# Patient Record
Sex: FEMALE | Race: WHITE | Employment: OTHER | URBAN - METROPOLITAN AREA
[De-identification: names, ages, dates, MRNs, and addresses within clinical notes are randomized per-mention and may not be internally consistent; named-entity substitution may affect disease eponyms.]

---

## 2023-05-17 ENCOUNTER — APPOINTMENT (OUTPATIENT)
Dept: GENERAL RADIOLOGY | Age: 88
DRG: 871 | End: 2023-05-17
Payer: MEDICARE

## 2023-05-17 ENCOUNTER — HOSPITAL ENCOUNTER (INPATIENT)
Age: 88
LOS: 5 days | Discharge: NURSING, REHAB OR LONG-TERM CARE | DRG: 871 | End: 2023-05-22
Attending: EMERGENCY MEDICINE | Admitting: STUDENT IN AN ORGANIZED HEALTH CARE EDUCATION/TRAINING PROGRAM
Payer: MEDICARE

## 2023-05-17 ENCOUNTER — APPOINTMENT (OUTPATIENT)
Dept: CT IMAGING | Age: 88
DRG: 871 | End: 2023-05-17
Payer: MEDICARE
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
## 2023-05-17 DIAGNOSIS — J18.9 PNEUMONIA OF RIGHT LOWER LOBE DUE TO INFECTIOUS ORGANISM: Primary | ICD-10-CM

## 2023-05-17 DIAGNOSIS — R41.82 ALTERED MENTAL STATUS, UNSPECIFIED ALTERED MENTAL STATUS TYPE: ICD-10-CM

## 2023-05-17 PROBLEM — A41.9 SEPSIS (HCC): Status: ACTIVE | Noted: 2023-05-17

## 2023-05-17 LAB
ALBUMIN SERPL-MCNC: 4.1 G/DL (ref 3.4–5)
ALP SERPL-CCNC: 117 U/L (ref 40–129)
ALT SERPL-CCNC: 143 U/L (ref 10–40)
AMORPH SED URNS QL MICRO: NORMAL /HPF
ANION GAP SERPL CALCULATED.3IONS-SCNC: 12 MMOL/L (ref 3–16)
AST SERPL-CCNC: 114 U/L (ref 15–37)
BASE EXCESS BLDV CALC-SCNC: 1.5 MMOL/L (ref -2–3)
BASOPHILS # BLD: 0 K/UL (ref 0–0.2)
BASOPHILS NFR BLD: 0.1 %
BILIRUB DIRECT SERPL-MCNC: <0.2 MG/DL (ref 0–0.3)
BILIRUB INDIRECT SERPL-MCNC: ABNORMAL MG/DL (ref 0–1)
BILIRUB SERPL-MCNC: 0.6 MG/DL (ref 0–1)
BILIRUB UR QL STRIP.AUTO: NEGATIVE
BUN SERPL-MCNC: 27 MG/DL (ref 7–20)
CALCIUM SERPL-MCNC: 10.1 MG/DL (ref 8.3–10.6)
CHLORIDE SERPL-SCNC: 97 MMOL/L (ref 99–110)
CLARITY UR: CLEAR
CO2 BLDV-SCNC: 29 MMOL/L
CO2 SERPL-SCNC: 25 MMOL/L (ref 21–32)
COHGB MFR BLDV: 1.6 % (ref 0–1.5)
COLOR UR: YELLOW
CREAT SERPL-MCNC: 1.3 MG/DL (ref 0.6–1.2)
DEPRECATED RDW RBC AUTO: 13.7 % (ref 12.4–15.4)
DO-HGB MFR BLDV: 63.9 %
EKG ATRIAL RATE: 96 BPM
EKG DIAGNOSIS: NORMAL
EKG P AXIS: 81 DEGREES
EKG P-R INTERVAL: 154 MS
EKG Q-T INTERVAL: 374 MS
EKG QRS DURATION: 68 MS
EKG QTC CALCULATION (BAZETT): 472 MS
EKG R AXIS: 30 DEGREES
EKG T AXIS: 65 DEGREES
EKG VENTRICULAR RATE: 96 BPM
EOSINOPHIL # BLD: 0 K/UL (ref 0–0.6)
EOSINOPHIL NFR BLD: 0 %
EPI CELLS #/AREA URNS HPF: NORMAL /HPF (ref 0–5)
FLUAV RNA UPPER RESP QL NAA+PROBE: NEGATIVE
FLUBV AG NPH QL: NEGATIVE
GFR SERPLBLD CREATININE-BSD FMLA CKD-EPI: 39 ML/MIN/{1.73_M2}
GLUCOSE BLD-MCNC: 109 MG/DL (ref 70–99)
GLUCOSE BLD-MCNC: 116 MG/DL (ref 70–99)
GLUCOSE BLD-MCNC: 120 MG/DL (ref 70–99)
GLUCOSE BLD-MCNC: 204 MG/DL (ref 70–99)
GLUCOSE SERPL-MCNC: 234 MG/DL (ref 70–99)
GLUCOSE UR STRIP.AUTO-MCNC: NEGATIVE MG/DL
HCO3 BLDV-SCNC: 27.8 MMOL/L (ref 24–28)
HCT VFR BLD AUTO: 41.7 % (ref 36–48)
HGB BLD-MCNC: 14 G/DL (ref 12–16)
HGB UR QL STRIP.AUTO: NEGATIVE
KETONES UR STRIP.AUTO-MCNC: ABNORMAL MG/DL
LACTATE BLDV-SCNC: 3.6 MMOL/L (ref 0.4–2)
LACTATE BLDV-SCNC: <0.2 MMOL/L (ref 0.4–2)
LEUKOCYTE ESTERASE UR QL STRIP.AUTO: ABNORMAL
LIPASE SERPL-CCNC: 21 U/L (ref 13–60)
LYMPHOCYTES # BLD: 0.7 K/UL (ref 1–5.1)
LYMPHOCYTES NFR BLD: 3.2 %
MAGNESIUM SERPL-MCNC: 1.9 MG/DL (ref 1.8–2.4)
MCH RBC QN AUTO: 30.8 PG (ref 26–34)
MCHC RBC AUTO-ENTMCNC: 33.5 G/DL (ref 31–36)
MCV RBC AUTO: 91.9 FL (ref 80–100)
METHGB MFR BLDV: 0.4 % (ref 0–1.5)
MONOCYTES # BLD: 0.9 K/UL (ref 0–1.3)
MONOCYTES NFR BLD: 4.5 %
NEUTROPHILS # BLD: 18.9 K/UL (ref 1.7–7.7)
NEUTROPHILS NFR BLD: 92.2 %
NITRITE UR QL STRIP.AUTO: NEGATIVE
NT-PROBNP SERPL-MCNC: 514 PG/ML (ref 0–449)
PCO2 BLDV: 49.3 MMHG (ref 41–51)
PERFORMED ON: ABNORMAL
PH BLDV: 7.36 [PH] (ref 7.35–7.45)
PH UR STRIP.AUTO: 6 [PH] (ref 5–8)
PHOSPHATE SERPL-MCNC: 3.8 MG/DL (ref 2.5–4.9)
PLATELET # BLD AUTO: 227 K/UL (ref 135–450)
PMV BLD AUTO: 7.7 FL (ref 5–10.5)
PO2 BLDV: <30 MMHG (ref 25–40)
POTASSIUM SERPL-SCNC: 4.7 MMOL/L (ref 3.5–5.1)
PROCALCITONIN SERPL IA-MCNC: 49.84 NG/ML (ref 0–0.15)
PROT SERPL-MCNC: 7.1 G/DL (ref 6.4–8.2)
PROT UR STRIP.AUTO-MCNC: ABNORMAL MG/DL
RBC # BLD AUTO: 4.54 M/UL (ref 4–5.2)
RBC #/AREA URNS HPF: NORMAL /HPF (ref 0–4)
SAO2 % BLDV: 35 %
SARS-COV-2 RDRP RESP QL NAA+PROBE: NOT DETECTED
SODIUM SERPL-SCNC: 134 MMOL/L (ref 136–145)
SP GR UR STRIP.AUTO: 1.02 (ref 1–1.03)
TROPONIN, HIGH SENSITIVITY: 42 NG/L (ref 0–14)
TROPONIN, HIGH SENSITIVITY: 44 NG/L (ref 0–14)
UA DIPSTICK W REFLEX MICRO PNL UR: YES
URN SPEC COLLECT METH UR: ABNORMAL
UROBILINOGEN UR STRIP-ACNC: 0.2 E.U./DL
WBC # BLD AUTO: 20.5 K/UL (ref 4–11)
WBC #/AREA URNS HPF: NORMAL /HPF (ref 0–5)

## 2023-05-17 PROCEDURE — 84100 ASSAY OF PHOSPHORUS: CPT

## 2023-05-17 PROCEDURE — 83690 ASSAY OF LIPASE: CPT

## 2023-05-17 PROCEDURE — 87040 BLOOD CULTURE FOR BACTERIA: CPT

## 2023-05-17 PROCEDURE — 87804 INFLUENZA ASSAY W/OPTIC: CPT

## 2023-05-17 PROCEDURE — 99285 EMERGENCY DEPT VISIT HI MDM: CPT

## 2023-05-17 PROCEDURE — 82803 BLOOD GASES ANY COMBINATION: CPT

## 2023-05-17 PROCEDURE — 71250 CT THORAX DX C-: CPT

## 2023-05-17 PROCEDURE — 1200000000 HC SEMI PRIVATE

## 2023-05-17 PROCEDURE — 70450 CT HEAD/BRAIN W/O DYE: CPT

## 2023-05-17 PROCEDURE — 80048 BASIC METABOLIC PNL TOTAL CA: CPT

## 2023-05-17 PROCEDURE — 83880 ASSAY OF NATRIURETIC PEPTIDE: CPT

## 2023-05-17 PROCEDURE — 96367 TX/PROPH/DG ADDL SEQ IV INF: CPT

## 2023-05-17 PROCEDURE — 71045 X-RAY EXAM CHEST 1 VIEW: CPT

## 2023-05-17 PROCEDURE — 93005 ELECTROCARDIOGRAM TRACING: CPT | Performed by: EMERGENCY MEDICINE

## 2023-05-17 PROCEDURE — 83605 ASSAY OF LACTIC ACID: CPT

## 2023-05-17 PROCEDURE — 84145 PROCALCITONIN (PCT): CPT

## 2023-05-17 PROCEDURE — 2580000003 HC RX 258: Performed by: EMERGENCY MEDICINE

## 2023-05-17 PROCEDURE — 96365 THER/PROPH/DIAG IV INF INIT: CPT

## 2023-05-17 PROCEDURE — 87449 NOS EACH ORGANISM AG IA: CPT

## 2023-05-17 PROCEDURE — 84443 ASSAY THYROID STIM HORMONE: CPT

## 2023-05-17 PROCEDURE — 80076 HEPATIC FUNCTION PANEL: CPT

## 2023-05-17 PROCEDURE — 83036 HEMOGLOBIN GLYCOSYLATED A1C: CPT

## 2023-05-17 PROCEDURE — 81001 URINALYSIS AUTO W/SCOPE: CPT

## 2023-05-17 PROCEDURE — 87635 SARS-COV-2 COVID-19 AMP PRB: CPT

## 2023-05-17 PROCEDURE — 84484 ASSAY OF TROPONIN QUANT: CPT

## 2023-05-17 PROCEDURE — 2580000003 HC RX 258: Performed by: STUDENT IN AN ORGANIZED HEALTH CARE EDUCATION/TRAINING PROGRAM

## 2023-05-17 PROCEDURE — 6360000002 HC RX W HCPCS: Performed by: EMERGENCY MEDICINE

## 2023-05-17 PROCEDURE — 83735 ASSAY OF MAGNESIUM: CPT

## 2023-05-17 PROCEDURE — 85025 COMPLETE CBC W/AUTO DIFF WBC: CPT

## 2023-05-17 PROCEDURE — 6360000002 HC RX W HCPCS: Performed by: STUDENT IN AN ORGANIZED HEALTH CARE EDUCATION/TRAINING PROGRAM

## 2023-05-17 PROCEDURE — 36415 COLL VENOUS BLD VENIPUNCTURE: CPT

## 2023-05-17 RX ORDER — POLYETHYLENE GLYCOL 3350 17 G/17G
17 POWDER, FOR SOLUTION ORAL DAILY PRN
Status: DISCONTINUED | OUTPATIENT
Start: 2023-05-17 | End: 2023-05-22 | Stop reason: HOSPADM

## 2023-05-17 RX ORDER — SODIUM CHLORIDE 0.9 % (FLUSH) 0.9 %
5-40 SYRINGE (ML) INJECTION PRN
Status: DISCONTINUED | OUTPATIENT
Start: 2023-05-17 | End: 2023-05-22 | Stop reason: HOSPADM

## 2023-05-17 RX ORDER — ACETAMINOPHEN 160 MG
1 TABLET,DISINTEGRATING ORAL DAILY
COMMUNITY

## 2023-05-17 RX ORDER — AZITHROMYCIN 250 MG/1
500 TABLET, FILM COATED ORAL EVERY 24 HOURS
Status: DISPENSED | OUTPATIENT
Start: 2023-05-18 | End: 2023-05-21

## 2023-05-17 RX ORDER — SODIUM CHLORIDE 9 MG/ML
INJECTION, SOLUTION INTRAVENOUS PRN
Status: DISCONTINUED | OUTPATIENT
Start: 2023-05-17 | End: 2023-05-22 | Stop reason: HOSPADM

## 2023-05-17 RX ORDER — INSULIN LISPRO 100 [IU]/ML
0-4 INJECTION, SOLUTION INTRAVENOUS; SUBCUTANEOUS NIGHTLY
Status: DISCONTINUED | OUTPATIENT
Start: 2023-05-17 | End: 2023-05-22 | Stop reason: HOSPADM

## 2023-05-17 RX ORDER — ACETAMINOPHEN 650 MG/1
650 SUPPOSITORY RECTAL EVERY 6 HOURS PRN
Status: DISCONTINUED | OUTPATIENT
Start: 2023-05-17 | End: 2023-05-22 | Stop reason: HOSPADM

## 2023-05-17 RX ORDER — SODIUM CHLORIDE, SODIUM LACTATE, POTASSIUM CHLORIDE, CALCIUM CHLORIDE 600; 310; 30; 20 MG/100ML; MG/100ML; MG/100ML; MG/100ML
INJECTION, SOLUTION INTRAVENOUS CONTINUOUS
Status: DISCONTINUED | OUTPATIENT
Start: 2023-05-17 | End: 2023-05-18

## 2023-05-17 RX ORDER — INSULIN LISPRO 100 [IU]/ML
0-4 INJECTION, SOLUTION INTRAVENOUS; SUBCUTANEOUS
Status: DISCONTINUED | OUTPATIENT
Start: 2023-05-17 | End: 2023-05-22 | Stop reason: HOSPADM

## 2023-05-17 RX ORDER — INSULIN GLARGINE 100 [IU]/ML
0.15 INJECTION, SOLUTION SUBCUTANEOUS NIGHTLY
Status: DISCONTINUED | OUTPATIENT
Start: 2023-05-17 | End: 2023-05-20

## 2023-05-17 RX ORDER — ATORVASTATIN CALCIUM 40 MG/1
40 TABLET, FILM COATED ORAL DAILY
COMMUNITY

## 2023-05-17 RX ORDER — HEPARIN SODIUM 5000 [USP'U]/ML
5000 INJECTION, SOLUTION INTRAVENOUS; SUBCUTANEOUS 2 TIMES DAILY
Status: DISCONTINUED | OUTPATIENT
Start: 2023-05-17 | End: 2023-05-22 | Stop reason: HOSPADM

## 2023-05-17 RX ORDER — ONDANSETRON 2 MG/ML
4 INJECTION INTRAMUSCULAR; INTRAVENOUS EVERY 6 HOURS PRN
Status: DISCONTINUED | OUTPATIENT
Start: 2023-05-17 | End: 2023-05-22 | Stop reason: HOSPADM

## 2023-05-17 RX ORDER — IPRATROPIUM BROMIDE AND ALBUTEROL SULFATE 2.5; .5 MG/3ML; MG/3ML
1 SOLUTION RESPIRATORY (INHALATION) EVERY 4 HOURS PRN
Status: DISCONTINUED | OUTPATIENT
Start: 2023-05-17 | End: 2023-05-22 | Stop reason: HOSPADM

## 2023-05-17 RX ORDER — ONDANSETRON 4 MG/1
4 TABLET, ORALLY DISINTEGRATING ORAL EVERY 8 HOURS PRN
Status: DISCONTINUED | OUTPATIENT
Start: 2023-05-17 | End: 2023-05-22 | Stop reason: HOSPADM

## 2023-05-17 RX ORDER — ASPIRIN 81 MG/1
81 TABLET, CHEWABLE ORAL DAILY
Status: DISCONTINUED | OUTPATIENT
Start: 2023-05-17 | End: 2023-05-22 | Stop reason: HOSPADM

## 2023-05-17 RX ORDER — GLUCAGON 1 MG/ML
1 KIT INJECTION PRN
Status: DISCONTINUED | OUTPATIENT
Start: 2023-05-17 | End: 2023-05-22 | Stop reason: HOSPADM

## 2023-05-17 RX ORDER — 0.9 % SODIUM CHLORIDE 0.9 %
1000 INTRAVENOUS SOLUTION INTRAVENOUS ONCE
Status: COMPLETED | OUTPATIENT
Start: 2023-05-17 | End: 2023-05-17

## 2023-05-17 RX ORDER — ACETAMINOPHEN 325 MG/1
650 TABLET ORAL EVERY 6 HOURS PRN
Status: DISCONTINUED | OUTPATIENT
Start: 2023-05-17 | End: 2023-05-22 | Stop reason: HOSPADM

## 2023-05-17 RX ORDER — ENOXAPARIN SODIUM 100 MG/ML
30 INJECTION SUBCUTANEOUS DAILY
Status: DISCONTINUED | OUTPATIENT
Start: 2023-05-17 | End: 2023-05-17 | Stop reason: ALTCHOICE

## 2023-05-17 RX ORDER — DEXTROSE MONOHYDRATE 100 MG/ML
INJECTION, SOLUTION INTRAVENOUS CONTINUOUS PRN
Status: DISCONTINUED | OUTPATIENT
Start: 2023-05-17 | End: 2023-05-22 | Stop reason: HOSPADM

## 2023-05-17 RX ORDER — SODIUM CHLORIDE 0.9 % (FLUSH) 0.9 %
5-40 SYRINGE (ML) INJECTION EVERY 12 HOURS SCHEDULED
Status: DISCONTINUED | OUTPATIENT
Start: 2023-05-17 | End: 2023-05-22 | Stop reason: HOSPADM

## 2023-05-17 RX ORDER — LEVOTHYROXINE SODIUM 0.12 MG/1
62.5 TABLET ORAL DAILY
Status: DISCONTINUED | OUTPATIENT
Start: 2023-05-17 | End: 2023-05-22 | Stop reason: HOSPADM

## 2023-05-17 RX ORDER — VITAMIN B COMPLEX
2000 TABLET ORAL DAILY
Status: DISCONTINUED | OUTPATIENT
Start: 2023-05-17 | End: 2023-05-22 | Stop reason: HOSPADM

## 2023-05-17 RX ORDER — ATORVASTATIN CALCIUM 40 MG/1
40 TABLET, FILM COATED ORAL NIGHTLY
Status: DISCONTINUED | OUTPATIENT
Start: 2023-05-17 | End: 2023-05-22 | Stop reason: HOSPADM

## 2023-05-17 RX ADMIN — AZITHROMYCIN MONOHYDRATE 500 MG: 500 INJECTION, POWDER, LYOPHILIZED, FOR SOLUTION INTRAVENOUS at 08:58

## 2023-05-17 RX ADMIN — SODIUM CHLORIDE 1000 MG: 900 INJECTION INTRAVENOUS at 07:26

## 2023-05-17 RX ADMIN — SODIUM CHLORIDE, POTASSIUM CHLORIDE, SODIUM LACTATE AND CALCIUM CHLORIDE: 600; 310; 30; 20 INJECTION, SOLUTION INTRAVENOUS at 17:40

## 2023-05-17 RX ADMIN — HEPARIN SODIUM 5000 UNITS: 5000 INJECTION INTRAVENOUS; SUBCUTANEOUS at 17:36

## 2023-05-17 RX ADMIN — SODIUM CHLORIDE 1000 ML: 0.9 INJECTION, SOLUTION INTRAVENOUS at 07:25

## 2023-05-17 RX ADMIN — SODIUM CHLORIDE, PRESERVATIVE FREE 10 ML: 5 INJECTION INTRAVENOUS at 23:32

## 2023-05-18 LAB
ALBUMIN SERPL-MCNC: 3.1 G/DL (ref 3.4–5)
ALBUMIN/GLOB SERPL: 1.3 {RATIO} (ref 1.1–2.2)
ALP SERPL-CCNC: 85 U/L (ref 40–129)
ALT SERPL-CCNC: 71 U/L (ref 10–40)
ANION GAP SERPL CALCULATED.3IONS-SCNC: 5 MMOL/L (ref 3–16)
AST SERPL-CCNC: 51 U/L (ref 15–37)
BASOPHILS # BLD: 0 K/UL (ref 0–0.2)
BASOPHILS NFR BLD: 0.4 %
BILIRUB SERPL-MCNC: 0.5 MG/DL (ref 0–1)
BUN SERPL-MCNC: 23 MG/DL (ref 7–20)
CALCIUM SERPL-MCNC: 8.8 MG/DL (ref 8.3–10.6)
CHLORIDE SERPL-SCNC: 106 MMOL/L (ref 99–110)
CO2 SERPL-SCNC: 25 MMOL/L (ref 21–32)
CREAT SERPL-MCNC: 0.7 MG/DL (ref 0.6–1.2)
CREAT UR-MCNC: 183.1 MG/DL (ref 28–259)
DEPRECATED RDW RBC AUTO: 13.7 % (ref 12.4–15.4)
EOSINOPHIL # BLD: 0.1 K/UL (ref 0–0.6)
EOSINOPHIL NFR BLD: 0.8 %
EST. AVERAGE GLUCOSE BLD GHB EST-MCNC: 191.5 MG/DL
GFR SERPLBLD CREATININE-BSD FMLA CKD-EPI: >60 ML/MIN/{1.73_M2}
GLUCOSE BLD-MCNC: 103 MG/DL (ref 70–99)
GLUCOSE BLD-MCNC: 128 MG/DL (ref 70–99)
GLUCOSE BLD-MCNC: 75 MG/DL (ref 70–99)
GLUCOSE BLD-MCNC: 90 MG/DL (ref 70–99)
GLUCOSE SERPL-MCNC: 117 MG/DL (ref 70–99)
HBA1C MFR BLD: 8.3 %
HCT VFR BLD AUTO: 34.9 % (ref 36–48)
HGB BLD-MCNC: 11.7 G/DL (ref 12–16)
LYMPHOCYTES # BLD: 1.4 K/UL (ref 1–5.1)
LYMPHOCYTES NFR BLD: 13.2 %
MCH RBC QN AUTO: 31.2 PG (ref 26–34)
MCHC RBC AUTO-ENTMCNC: 33.5 G/DL (ref 31–36)
MCV RBC AUTO: 93.2 FL (ref 80–100)
MONOCYTES # BLD: 0.9 K/UL (ref 0–1.3)
MONOCYTES NFR BLD: 8.3 %
NEUTROPHILS # BLD: 8 K/UL (ref 1.7–7.7)
NEUTROPHILS NFR BLD: 77.3 %
PERFORMED ON: ABNORMAL
PERFORMED ON: ABNORMAL
PERFORMED ON: NORMAL
PERFORMED ON: NORMAL
PLATELET # BLD AUTO: 183 K/UL (ref 135–450)
PMV BLD AUTO: 7.8 FL (ref 5–10.5)
POTASSIUM SERPL-SCNC: 4.2 MMOL/L (ref 3.5–5.1)
PROT SERPL-MCNC: 5.5 G/DL (ref 6.4–8.2)
PROT UR-MCNC: 37 MG/DL
PROT/CREAT UR-RTO: 0.2 MG/DL
RBC # BLD AUTO: 3.74 M/UL (ref 4–5.2)
SODIUM SERPL-SCNC: 136 MMOL/L (ref 136–145)
SODIUM UR-SCNC: 91 MMOL/L
TSH SERPL DL<=0.005 MIU/L-ACNC: 3.86 UIU/ML (ref 0.27–4.2)
WBC # BLD AUTO: 10.3 K/UL (ref 4–11)

## 2023-05-18 PROCEDURE — 2500000003 HC RX 250 WO HCPCS: Performed by: STUDENT IN AN ORGANIZED HEALTH CARE EDUCATION/TRAINING PROGRAM

## 2023-05-18 PROCEDURE — 85025 COMPLETE CBC W/AUTO DIFF WBC: CPT

## 2023-05-18 PROCEDURE — 51798 US URINE CAPACITY MEASURE: CPT

## 2023-05-18 PROCEDURE — 82570 ASSAY OF URINE CREATININE: CPT

## 2023-05-18 PROCEDURE — 6360000002 HC RX W HCPCS: Performed by: STUDENT IN AN ORGANIZED HEALTH CARE EDUCATION/TRAINING PROGRAM

## 2023-05-18 PROCEDURE — 36415 COLL VENOUS BLD VENIPUNCTURE: CPT

## 2023-05-18 PROCEDURE — 84156 ASSAY OF PROTEIN URINE: CPT

## 2023-05-18 PROCEDURE — 2580000003 HC RX 258: Performed by: STUDENT IN AN ORGANIZED HEALTH CARE EDUCATION/TRAINING PROGRAM

## 2023-05-18 PROCEDURE — 51701 INSERT BLADDER CATHETER: CPT

## 2023-05-18 PROCEDURE — 80053 COMPREHEN METABOLIC PANEL: CPT

## 2023-05-18 PROCEDURE — 84300 ASSAY OF URINE SODIUM: CPT

## 2023-05-18 PROCEDURE — 92610 EVALUATE SWALLOWING FUNCTION: CPT

## 2023-05-18 PROCEDURE — 1200000000 HC SEMI PRIVATE

## 2023-05-18 RX ORDER — DEXTROSE, SODIUM CHLORIDE, SODIUM LACTATE, POTASSIUM CHLORIDE, AND CALCIUM CHLORIDE 5; .6; .31; .03; .02 G/100ML; G/100ML; G/100ML; G/100ML; G/100ML
INJECTION, SOLUTION INTRAVENOUS CONTINUOUS
Status: DISCONTINUED | OUTPATIENT
Start: 2023-05-18 | End: 2023-05-20

## 2023-05-18 RX ORDER — METRONIDAZOLE 500 MG/100ML
500 INJECTION, SOLUTION INTRAVENOUS EVERY 8 HOURS
Status: DISCONTINUED | OUTPATIENT
Start: 2023-05-18 | End: 2023-05-21

## 2023-05-18 RX ADMIN — METRONIDAZOLE 500 MG: 500 INJECTION, SOLUTION INTRAVENOUS at 23:59

## 2023-05-18 RX ADMIN — METRONIDAZOLE 500 MG: 500 INJECTION, SOLUTION INTRAVENOUS at 14:28

## 2023-05-18 RX ADMIN — HEPARIN SODIUM 5000 UNITS: 5000 INJECTION INTRAVENOUS; SUBCUTANEOUS at 06:31

## 2023-05-18 RX ADMIN — CEFTRIAXONE 1000 MG: 1 INJECTION, POWDER, FOR SOLUTION INTRAMUSCULAR; INTRAVENOUS at 06:37

## 2023-05-18 RX ADMIN — HEPARIN SODIUM 5000 UNITS: 5000 INJECTION INTRAVENOUS; SUBCUTANEOUS at 18:03

## 2023-05-18 RX ADMIN — SODIUM CHLORIDE, POTASSIUM CHLORIDE, SODIUM LACTATE AND CALCIUM CHLORIDE: 600; 310; 30; 20 INJECTION, SOLUTION INTRAVENOUS at 14:27

## 2023-05-18 RX ADMIN — SODIUM CHLORIDE, SODIUM LACTATE, POTASSIUM CHLORIDE, CALCIUM CHLORIDE AND DEXTROSE MONOHYDRATE: 5; 600; 310; 30; 20 INJECTION, SOLUTION INTRAVENOUS at 17:58

## 2023-05-19 ENCOUNTER — APPOINTMENT (OUTPATIENT)
Dept: GENERAL RADIOLOGY | Age: 88
DRG: 871 | End: 2023-05-19
Payer: MEDICARE

## 2023-05-19 LAB
ALBUMIN SERPL-MCNC: 3.2 G/DL (ref 3.4–5)
ALBUMIN/GLOB SERPL: 1.2 {RATIO} (ref 1.1–2.2)
ALP SERPL-CCNC: 86 U/L (ref 40–129)
ALT SERPL-CCNC: 54 U/L (ref 10–40)
ANION GAP SERPL CALCULATED.3IONS-SCNC: 8 MMOL/L (ref 3–16)
AST SERPL-CCNC: 41 U/L (ref 15–37)
BASOPHILS # BLD: 0.1 K/UL (ref 0–0.2)
BASOPHILS NFR BLD: 1 %
BILIRUB SERPL-MCNC: 0.4 MG/DL (ref 0–1)
BUN SERPL-MCNC: 15 MG/DL (ref 7–20)
CALCIUM SERPL-MCNC: 8.8 MG/DL (ref 8.3–10.6)
CHLORIDE SERPL-SCNC: 102 MMOL/L (ref 99–110)
CO2 SERPL-SCNC: 25 MMOL/L (ref 21–32)
CREAT SERPL-MCNC: 0.7 MG/DL (ref 0.6–1.2)
DEPRECATED RDW RBC AUTO: 13.1 % (ref 12.4–15.4)
EOSINOPHIL # BLD: 0.1 K/UL (ref 0–0.6)
EOSINOPHIL NFR BLD: 1.6 %
GFR SERPLBLD CREATININE-BSD FMLA CKD-EPI: >60 ML/MIN/{1.73_M2}
GLUCOSE BLD-MCNC: 113 MG/DL (ref 70–99)
GLUCOSE BLD-MCNC: 146 MG/DL (ref 70–99)
GLUCOSE BLD-MCNC: 161 MG/DL (ref 70–99)
GLUCOSE BLD-MCNC: 192 MG/DL (ref 70–99)
GLUCOSE SERPL-MCNC: 171 MG/DL (ref 70–99)
HCT VFR BLD AUTO: 35.8 % (ref 36–48)
HGB BLD-MCNC: 12 G/DL (ref 12–16)
LEGIONELLA AG UR QL: NORMAL
LYMPHOCYTES # BLD: 1 K/UL (ref 1–5.1)
LYMPHOCYTES NFR BLD: 15.6 %
MCH RBC QN AUTO: 30.8 PG (ref 26–34)
MCHC RBC AUTO-ENTMCNC: 33.4 G/DL (ref 31–36)
MCV RBC AUTO: 92.2 FL (ref 80–100)
MONOCYTES # BLD: 0.6 K/UL (ref 0–1.3)
MONOCYTES NFR BLD: 9.6 %
NEUTROPHILS # BLD: 4.4 K/UL (ref 1.7–7.7)
NEUTROPHILS NFR BLD: 72.2 %
PERFORMED ON: ABNORMAL
PLATELET # BLD AUTO: 182 K/UL (ref 135–450)
PMV BLD AUTO: 7.8 FL (ref 5–10.5)
POTASSIUM SERPL-SCNC: 3.7 MMOL/L (ref 3.5–5.1)
PROT SERPL-MCNC: 5.8 G/DL (ref 6.4–8.2)
RBC # BLD AUTO: 3.89 M/UL (ref 4–5.2)
S PNEUM AG UR QL: NORMAL
SODIUM SERPL-SCNC: 135 MMOL/L (ref 136–145)
WBC # BLD AUTO: 6.1 K/UL (ref 4–11)

## 2023-05-19 PROCEDURE — 51798 US URINE CAPACITY MEASURE: CPT

## 2023-05-19 PROCEDURE — 97530 THERAPEUTIC ACTIVITIES: CPT

## 2023-05-19 PROCEDURE — 92526 ORAL FUNCTION THERAPY: CPT

## 2023-05-19 PROCEDURE — 74230 X-RAY XM SWLNG FUNCJ C+: CPT

## 2023-05-19 PROCEDURE — 2500000003 HC RX 250 WO HCPCS: Performed by: STUDENT IN AN ORGANIZED HEALTH CARE EDUCATION/TRAINING PROGRAM

## 2023-05-19 PROCEDURE — 36415 COLL VENOUS BLD VENIPUNCTURE: CPT

## 2023-05-19 PROCEDURE — 97535 SELF CARE MNGMENT TRAINING: CPT

## 2023-05-19 PROCEDURE — 2580000003 HC RX 258: Performed by: STUDENT IN AN ORGANIZED HEALTH CARE EDUCATION/TRAINING PROGRAM

## 2023-05-19 PROCEDURE — 97162 PT EVAL MOD COMPLEX 30 MIN: CPT

## 2023-05-19 PROCEDURE — 80053 COMPREHEN METABOLIC PANEL: CPT

## 2023-05-19 PROCEDURE — 97167 OT EVAL HIGH COMPLEX 60 MIN: CPT

## 2023-05-19 PROCEDURE — 6370000000 HC RX 637 (ALT 250 FOR IP): Performed by: STUDENT IN AN ORGANIZED HEALTH CARE EDUCATION/TRAINING PROGRAM

## 2023-05-19 PROCEDURE — 1200000000 HC SEMI PRIVATE

## 2023-05-19 PROCEDURE — 97116 GAIT TRAINING THERAPY: CPT

## 2023-05-19 PROCEDURE — 94640 AIRWAY INHALATION TREATMENT: CPT

## 2023-05-19 PROCEDURE — 85025 COMPLETE CBC W/AUTO DIFF WBC: CPT

## 2023-05-19 PROCEDURE — 92611 MOTION FLUOROSCOPY/SWALLOW: CPT

## 2023-05-19 PROCEDURE — 51701 INSERT BLADDER CATHETER: CPT

## 2023-05-19 PROCEDURE — 51702 INSERT TEMP BLADDER CATH: CPT

## 2023-05-19 PROCEDURE — 6360000002 HC RX W HCPCS: Performed by: STUDENT IN AN ORGANIZED HEALTH CARE EDUCATION/TRAINING PROGRAM

## 2023-05-19 PROCEDURE — 6370000000 HC RX 637 (ALT 250 FOR IP): Performed by: NURSE PRACTITIONER

## 2023-05-19 RX ADMIN — INSULIN GLARGINE 7 UNITS: 100 INJECTION, SOLUTION SUBCUTANEOUS at 20:20

## 2023-05-19 RX ADMIN — SODIUM CHLORIDE, SODIUM LACTATE, POTASSIUM CHLORIDE, CALCIUM CHLORIDE AND DEXTROSE MONOHYDRATE: 5; 600; 310; 30; 20 INJECTION, SOLUTION INTRAVENOUS at 20:25

## 2023-05-19 RX ADMIN — METRONIDAZOLE 500 MG: 500 INJECTION, SOLUTION INTRAVENOUS at 09:41

## 2023-05-19 RX ADMIN — ATORVASTATIN CALCIUM 40 MG: 40 TABLET, FILM COATED ORAL at 20:21

## 2023-05-19 RX ADMIN — METRONIDAZOLE 500 MG: 500 INJECTION, SOLUTION INTRAVENOUS at 17:17

## 2023-05-19 RX ADMIN — HEPARIN SODIUM 5000 UNITS: 5000 INJECTION INTRAVENOUS; SUBCUTANEOUS at 06:55

## 2023-05-19 RX ADMIN — SODIUM CHLORIDE, SODIUM LACTATE, POTASSIUM CHLORIDE, CALCIUM CHLORIDE AND DEXTROSE MONOHYDRATE: 5; 600; 310; 30; 20 INJECTION, SOLUTION INTRAVENOUS at 03:27

## 2023-05-19 RX ADMIN — CEFTRIAXONE 1000 MG: 1 INJECTION, POWDER, FOR SOLUTION INTRAMUSCULAR; INTRAVENOUS at 07:53

## 2023-05-19 RX ADMIN — SODIUM CHLORIDE, SODIUM LACTATE, POTASSIUM CHLORIDE, CALCIUM CHLORIDE AND DEXTROSE MONOHYDRATE: 5; 600; 310; 30; 20 INJECTION, SOLUTION INTRAVENOUS at 11:59

## 2023-05-19 RX ADMIN — IPRATROPIUM BROMIDE AND ALBUTEROL SULFATE 1 AMPULE: .5; 3 SOLUTION RESPIRATORY (INHALATION) at 03:37

## 2023-05-19 RX ADMIN — HEPARIN SODIUM 5000 UNITS: 5000 INJECTION INTRAVENOUS; SUBCUTANEOUS at 18:43

## 2023-05-20 LAB
ALBUMIN SERPL-MCNC: 3.2 G/DL (ref 3.4–5)
ALBUMIN/GLOB SERPL: 1.5 {RATIO} (ref 1.1–2.2)
ALP SERPL-CCNC: 81 U/L (ref 40–129)
ALT SERPL-CCNC: 43 U/L (ref 10–40)
ANION GAP SERPL CALCULATED.3IONS-SCNC: 6 MMOL/L (ref 3–16)
AST SERPL-CCNC: 35 U/L (ref 15–37)
BASOPHILS # BLD: 0 K/UL (ref 0–0.2)
BASOPHILS NFR BLD: 0.9 %
BILIRUB SERPL-MCNC: 0.5 MG/DL (ref 0–1)
BUN SERPL-MCNC: 8 MG/DL (ref 7–20)
CALCIUM SERPL-MCNC: 8.7 MG/DL (ref 8.3–10.6)
CHLORIDE SERPL-SCNC: 106 MMOL/L (ref 99–110)
CO2 SERPL-SCNC: 27 MMOL/L (ref 21–32)
CREAT SERPL-MCNC: 0.7 MG/DL (ref 0.6–1.2)
DEPRECATED RDW RBC AUTO: 13.5 % (ref 12.4–15.4)
EOSINOPHIL # BLD: 0.1 K/UL (ref 0–0.6)
EOSINOPHIL NFR BLD: 2.1 %
GFR SERPLBLD CREATININE-BSD FMLA CKD-EPI: >60 ML/MIN/{1.73_M2}
GLUCOSE BLD-MCNC: 101 MG/DL (ref 70–99)
GLUCOSE BLD-MCNC: 146 MG/DL (ref 70–99)
GLUCOSE BLD-MCNC: 165 MG/DL (ref 70–99)
GLUCOSE BLD-MCNC: 96 MG/DL (ref 70–99)
GLUCOSE SERPL-MCNC: 178 MG/DL (ref 70–99)
HCT VFR BLD AUTO: 33.5 % (ref 36–48)
HGB BLD-MCNC: 11.6 G/DL (ref 12–16)
LYMPHOCYTES # BLD: 1 K/UL (ref 1–5.1)
LYMPHOCYTES NFR BLD: 18.4 %
MCH RBC QN AUTO: 31.3 PG (ref 26–34)
MCHC RBC AUTO-ENTMCNC: 34.5 G/DL (ref 31–36)
MCV RBC AUTO: 90.7 FL (ref 80–100)
MONOCYTES # BLD: 0.5 K/UL (ref 0–1.3)
MONOCYTES NFR BLD: 10.4 %
NEUTROPHILS # BLD: 3.5 K/UL (ref 1.7–7.7)
NEUTROPHILS NFR BLD: 68.2 %
PERFORMED ON: ABNORMAL
PERFORMED ON: NORMAL
PLATELET # BLD AUTO: 190 K/UL (ref 135–450)
PMV BLD AUTO: 8.2 FL (ref 5–10.5)
POTASSIUM SERPL-SCNC: 3.9 MMOL/L (ref 3.5–5.1)
PROT SERPL-MCNC: 5.4 G/DL (ref 6.4–8.2)
RBC # BLD AUTO: 3.69 M/UL (ref 4–5.2)
SODIUM SERPL-SCNC: 139 MMOL/L (ref 136–145)
WBC # BLD AUTO: 5.2 K/UL (ref 4–11)

## 2023-05-20 PROCEDURE — 1200000000 HC SEMI PRIVATE

## 2023-05-20 PROCEDURE — 85025 COMPLETE CBC W/AUTO DIFF WBC: CPT

## 2023-05-20 PROCEDURE — 6360000002 HC RX W HCPCS: Performed by: STUDENT IN AN ORGANIZED HEALTH CARE EDUCATION/TRAINING PROGRAM

## 2023-05-20 PROCEDURE — 36415 COLL VENOUS BLD VENIPUNCTURE: CPT

## 2023-05-20 PROCEDURE — 2500000003 HC RX 250 WO HCPCS: Performed by: STUDENT IN AN ORGANIZED HEALTH CARE EDUCATION/TRAINING PROGRAM

## 2023-05-20 PROCEDURE — 2580000003 HC RX 258: Performed by: STUDENT IN AN ORGANIZED HEALTH CARE EDUCATION/TRAINING PROGRAM

## 2023-05-20 PROCEDURE — 80053 COMPREHEN METABOLIC PANEL: CPT

## 2023-05-20 PROCEDURE — 6370000000 HC RX 637 (ALT 250 FOR IP): Performed by: STUDENT IN AN ORGANIZED HEALTH CARE EDUCATION/TRAINING PROGRAM

## 2023-05-20 RX ORDER — INSULIN GLARGINE 100 [IU]/ML
8 INJECTION, SOLUTION SUBCUTANEOUS NIGHTLY
Status: DISCONTINUED | OUTPATIENT
Start: 2023-05-20 | End: 2023-05-22 | Stop reason: HOSPADM

## 2023-05-20 RX ADMIN — METRONIDAZOLE 500 MG: 500 INJECTION, SOLUTION INTRAVENOUS at 01:11

## 2023-05-20 RX ADMIN — METRONIDAZOLE 500 MG: 500 INJECTION, SOLUTION INTRAVENOUS at 10:15

## 2023-05-20 RX ADMIN — ASPIRIN 81 MG 81 MG: 81 TABLET ORAL at 08:49

## 2023-05-20 RX ADMIN — SODIUM CHLORIDE, PRESERVATIVE FREE 10 ML: 5 INJECTION INTRAVENOUS at 08:55

## 2023-05-20 RX ADMIN — HEPARIN SODIUM 5000 UNITS: 5000 INJECTION INTRAVENOUS; SUBCUTANEOUS at 17:35

## 2023-05-20 RX ADMIN — SODIUM CHLORIDE, PRESERVATIVE FREE 10 ML: 5 INJECTION INTRAVENOUS at 20:34

## 2023-05-20 RX ADMIN — ATORVASTATIN CALCIUM 40 MG: 40 TABLET, FILM COATED ORAL at 20:34

## 2023-05-20 RX ADMIN — METRONIDAZOLE 500 MG: 500 INJECTION, SOLUTION INTRAVENOUS at 15:14

## 2023-05-20 RX ADMIN — Medication 2000 UNITS: at 08:49

## 2023-05-20 RX ADMIN — AZITHROMYCIN MONOHYDRATE 500 MG: 250 TABLET ORAL at 08:49

## 2023-05-20 RX ADMIN — METRONIDAZOLE 500 MG: 500 INJECTION, SOLUTION INTRAVENOUS at 23:45

## 2023-05-20 RX ADMIN — HEPARIN SODIUM 5000 UNITS: 5000 INJECTION INTRAVENOUS; SUBCUTANEOUS at 06:14

## 2023-05-20 RX ADMIN — CEFTRIAXONE 1000 MG: 1 INJECTION, POWDER, FOR SOLUTION INTRAMUSCULAR; INTRAVENOUS at 08:53

## 2023-05-21 LAB
BACTERIA BLD CULT ORG #2: NORMAL
BACTERIA BLD CULT: NORMAL
GLUCOSE BLD-MCNC: 122 MG/DL (ref 70–99)
GLUCOSE BLD-MCNC: 126 MG/DL (ref 70–99)
GLUCOSE BLD-MCNC: 91 MG/DL (ref 70–99)
GLUCOSE BLD-MCNC: 94 MG/DL (ref 70–99)
PERFORMED ON: ABNORMAL
PERFORMED ON: ABNORMAL
PERFORMED ON: NORMAL
PERFORMED ON: NORMAL

## 2023-05-21 PROCEDURE — 51701 INSERT BLADDER CATHETER: CPT

## 2023-05-21 PROCEDURE — 51798 US URINE CAPACITY MEASURE: CPT

## 2023-05-21 PROCEDURE — 2500000003 HC RX 250 WO HCPCS: Performed by: STUDENT IN AN ORGANIZED HEALTH CARE EDUCATION/TRAINING PROGRAM

## 2023-05-21 PROCEDURE — 6370000000 HC RX 637 (ALT 250 FOR IP): Performed by: NURSE PRACTITIONER

## 2023-05-21 PROCEDURE — 6360000002 HC RX W HCPCS: Performed by: STUDENT IN AN ORGANIZED HEALTH CARE EDUCATION/TRAINING PROGRAM

## 2023-05-21 PROCEDURE — 2580000003 HC RX 258: Performed by: STUDENT IN AN ORGANIZED HEALTH CARE EDUCATION/TRAINING PROGRAM

## 2023-05-21 PROCEDURE — 1200000000 HC SEMI PRIVATE

## 2023-05-21 PROCEDURE — 6370000000 HC RX 637 (ALT 250 FOR IP): Performed by: STUDENT IN AN ORGANIZED HEALTH CARE EDUCATION/TRAINING PROGRAM

## 2023-05-21 RX ORDER — METRONIDAZOLE 500 MG/1
500 TABLET ORAL EVERY 8 HOURS SCHEDULED
Status: DISCONTINUED | OUTPATIENT
Start: 2023-05-21 | End: 2023-05-22 | Stop reason: HOSPADM

## 2023-05-21 RX ADMIN — METRONIDAZOLE 500 MG: 500 TABLET ORAL at 21:01

## 2023-05-21 RX ADMIN — ATORVASTATIN CALCIUM 40 MG: 40 TABLET, FILM COATED ORAL at 21:01

## 2023-05-21 RX ADMIN — METRONIDAZOLE 500 MG: 500 INJECTION, SOLUTION INTRAVENOUS at 07:56

## 2023-05-21 RX ADMIN — METRONIDAZOLE 500 MG: 500 INJECTION, SOLUTION INTRAVENOUS at 16:19

## 2023-05-21 RX ADMIN — HEPARIN SODIUM 5000 UNITS: 5000 INJECTION INTRAVENOUS; SUBCUTANEOUS at 06:11

## 2023-05-21 RX ADMIN — LEVOTHYROXINE SODIUM 62.5 MCG: 0.12 TABLET ORAL at 06:11

## 2023-05-21 RX ADMIN — ASPIRIN 81 MG 81 MG: 81 TABLET ORAL at 07:52

## 2023-05-21 RX ADMIN — HEPARIN SODIUM 5000 UNITS: 5000 INJECTION INTRAVENOUS; SUBCUTANEOUS at 16:17

## 2023-05-21 RX ADMIN — Medication 2000 UNITS: at 07:52

## 2023-05-21 RX ADMIN — CEFTRIAXONE 1000 MG: 1 INJECTION, POWDER, FOR SOLUTION INTRAMUSCULAR; INTRAVENOUS at 06:10

## 2023-05-22 VITALS
BODY MASS INDEX: 20.34 KG/M2 | SYSTOLIC BLOOD PRESSURE: 131 MMHG | DIASTOLIC BLOOD PRESSURE: 88 MMHG | HEART RATE: 91 BPM | RESPIRATION RATE: 16 BRPM | TEMPERATURE: 97.5 F | HEIGHT: 60 IN | OXYGEN SATURATION: 95 % | WEIGHT: 103.6 LBS

## 2023-05-22 LAB
ANION GAP SERPL CALCULATED.3IONS-SCNC: 12 MMOL/L (ref 3–16)
BUN SERPL-MCNC: 14 MG/DL (ref 7–20)
CALCIUM SERPL-MCNC: 9.2 MG/DL (ref 8.3–10.6)
CHLORIDE SERPL-SCNC: 100 MMOL/L (ref 99–110)
CO2 SERPL-SCNC: 23 MMOL/L (ref 21–32)
CREAT SERPL-MCNC: 0.7 MG/DL (ref 0.6–1.2)
GFR SERPLBLD CREATININE-BSD FMLA CKD-EPI: >60 ML/MIN/{1.73_M2}
GLUCOSE BLD-MCNC: 140 MG/DL (ref 70–99)
GLUCOSE BLD-MCNC: 220 MG/DL (ref 70–99)
GLUCOSE BLD-MCNC: 87 MG/DL (ref 70–99)
GLUCOSE SERPL-MCNC: 151 MG/DL (ref 70–99)
MAGNESIUM SERPL-MCNC: 1.8 MG/DL (ref 1.8–2.4)
PERFORMED ON: ABNORMAL
PERFORMED ON: ABNORMAL
PERFORMED ON: NORMAL
POTASSIUM SERPL-SCNC: 3.5 MMOL/L (ref 3.5–5.1)
SODIUM SERPL-SCNC: 135 MMOL/L (ref 136–145)

## 2023-05-22 PROCEDURE — 6360000002 HC RX W HCPCS: Performed by: STUDENT IN AN ORGANIZED HEALTH CARE EDUCATION/TRAINING PROGRAM

## 2023-05-22 PROCEDURE — 97530 THERAPEUTIC ACTIVITIES: CPT

## 2023-05-22 PROCEDURE — 97535 SELF CARE MNGMENT TRAINING: CPT

## 2023-05-22 PROCEDURE — 6370000000 HC RX 637 (ALT 250 FOR IP): Performed by: NURSE PRACTITIONER

## 2023-05-22 PROCEDURE — 6370000000 HC RX 637 (ALT 250 FOR IP): Performed by: STUDENT IN AN ORGANIZED HEALTH CARE EDUCATION/TRAINING PROGRAM

## 2023-05-22 PROCEDURE — 97116 GAIT TRAINING THERAPY: CPT

## 2023-05-22 PROCEDURE — 83735 ASSAY OF MAGNESIUM: CPT

## 2023-05-22 PROCEDURE — 2580000003 HC RX 258: Performed by: STUDENT IN AN ORGANIZED HEALTH CARE EDUCATION/TRAINING PROGRAM

## 2023-05-22 PROCEDURE — 93005 ELECTROCARDIOGRAM TRACING: CPT | Performed by: STUDENT IN AN ORGANIZED HEALTH CARE EDUCATION/TRAINING PROGRAM

## 2023-05-22 PROCEDURE — 36415 COLL VENOUS BLD VENIPUNCTURE: CPT

## 2023-05-22 PROCEDURE — 80048 BASIC METABOLIC PNL TOTAL CA: CPT

## 2023-05-22 RX ORDER — LANOLIN ALCOHOL/MO/W.PET/CERES
400 CREAM (GRAM) TOPICAL ONCE
Status: COMPLETED | OUTPATIENT
Start: 2023-05-22 | End: 2023-05-22

## 2023-05-22 RX ORDER — POTASSIUM CHLORIDE 20 MEQ/1
20 TABLET, EXTENDED RELEASE ORAL ONCE
Status: COMPLETED | OUTPATIENT
Start: 2023-05-22 | End: 2023-05-22

## 2023-05-22 RX ORDER — IPRATROPIUM BROMIDE AND ALBUTEROL SULFATE 2.5; .5 MG/3ML; MG/3ML
3 SOLUTION RESPIRATORY (INHALATION) EVERY 4 HOURS PRN
Qty: 360 ML | Refills: 0
Start: 2023-05-22

## 2023-05-22 RX ORDER — ONDANSETRON 4 MG/1
4 TABLET, ORALLY DISINTEGRATING ORAL EVERY 8 HOURS PRN
Qty: 12 TABLET | Refills: 0
Start: 2023-05-22

## 2023-05-22 RX ORDER — AMLODIPINE BESYLATE 5 MG/1
5 TABLET ORAL DAILY
Status: DISCONTINUED | OUTPATIENT
Start: 2023-05-22 | End: 2023-05-22

## 2023-05-22 RX ORDER — LISINOPRIL 10 MG/1
10 TABLET ORAL DAILY
Status: DISCONTINUED | OUTPATIENT
Start: 2023-05-22 | End: 2023-05-22 | Stop reason: HOSPADM

## 2023-05-22 RX ORDER — AMLODIPINE BESYLATE 10 MG/1
10 TABLET ORAL DAILY
Status: DISCONTINUED | OUTPATIENT
Start: 2023-05-22 | End: 2023-05-22

## 2023-05-22 RX ORDER — INSULIN GLARGINE 100 [IU]/ML
8 INJECTION, SOLUTION SUBCUTANEOUS NIGHTLY
Qty: 10 ML | Refills: 3
Start: 2023-05-22

## 2023-05-22 RX ORDER — AMLODIPINE BESYLATE 5 MG/1
5 TABLET ORAL DAILY
Qty: 30 TABLET | Refills: 0
Start: 2023-05-23 | End: 2023-05-22 | Stop reason: HOSPADM

## 2023-05-22 RX ADMIN — METRONIDAZOLE 500 MG: 500 TABLET ORAL at 06:13

## 2023-05-22 RX ADMIN — AMLODIPINE BESYLATE 5 MG: 5 TABLET ORAL at 08:49

## 2023-05-22 RX ADMIN — POTASSIUM CHLORIDE 20 MEQ: 1500 TABLET, EXTENDED RELEASE ORAL at 18:52

## 2023-05-22 RX ADMIN — ASPIRIN 81 MG 81 MG: 81 TABLET ORAL at 08:49

## 2023-05-22 RX ADMIN — CEFTRIAXONE 1000 MG: 1 INJECTION, POWDER, FOR SOLUTION INTRAMUSCULAR; INTRAVENOUS at 07:56

## 2023-05-22 RX ADMIN — INSULIN LISPRO 1 UNITS: 100 INJECTION, SOLUTION INTRAVENOUS; SUBCUTANEOUS at 12:50

## 2023-05-22 RX ADMIN — HEPARIN SODIUM 5000 UNITS: 5000 INJECTION INTRAVENOUS; SUBCUTANEOUS at 06:13

## 2023-05-22 RX ADMIN — METRONIDAZOLE 500 MG: 500 TABLET ORAL at 15:39

## 2023-05-22 RX ADMIN — LEVOTHYROXINE SODIUM 62.5 MCG: 0.12 TABLET ORAL at 06:13

## 2023-05-22 RX ADMIN — Medication 2000 UNITS: at 08:49

## 2023-05-22 RX ADMIN — LISINOPRIL 10 MG: 10 TABLET ORAL at 08:49

## 2023-05-22 RX ADMIN — Medication 400 MG: at 18:52

## 2023-05-22 NOTE — PROGRESS NOTES
V2.0    [facility] [place]      Name:  Tonio Benítez /Age/Sex: 1932  (80 y.o. female)   MRN & CSN:  [id number] & [id number] Encounter Date/Time: 2023 8:28 AM EDT   Location:  [id number] PCP: Henry Brooke MD     [facility], [address] [place] [address] Day: 6    Assessment and Recommendations   Tonio Benítez is a 80 y.o. female with a pmh of PMH of HTN, Type II DM, hypothyroidism, tobacco use  who presents with Sepsis ([facility] [address] likely 2/2 PNA        #Severe Sepsis, meets criteria via Sepsis II with leukocytosis, tachycardia >90 bpm and elevated lactic acid above 2.5; current studies most suggestive of pneumonia, improved  #CAP  - Tele  - Continue maintenance IVF  - Continue Rocephin day ; finish after today  - Blood cultures obtained, NGTD  - Checked flu and COVID and negative  - Legionella and Strep pneumo antigens negative  - Inhaled bronchodilator prn     #Dysphagia w/ Zenker Diverticulum  - SLP consulted, MBS passed and resumed diet  - GI and surgery consulted: no operation planned for now given risks    #KAYCEE, Cr 1.3 from unknown baseline, suspect 2/2 dehydration, now improved to 0.7  - Trend renal function labs  - Avoid nephrotoxins     #Type II DM with hyperglycemia  - Hold home glipizide  - Continue SSI and low dose glargine; BG well controlled now  - Obtained Hgb A1c; ~8%  - POC glucose checks with hypoglycemia protocol prn     #HTN  - Reintroduce anti-hypertensives now that BP climbing     #Vertebral compression fracutre at L3-4  --Reviewed outside records from [facility] PCP office. Fracture diagnosed originally on  in ED after fall on   --Continue analgesia prn  --PT/OT  --Has referral for outpatient Ortho f/u from PCP     #ASCVD  - Continue ASA and statin       Diet ADULT DIET;  Regular   DVT Prophylaxis [x] Lovenox, []  Heparin, [] SCDs, [] Ambulation,  [] Eliquis, [] Xarelto  [] Coumadin   Code Status Limited   Disposition From: Home  Expected Disposition: Home vs.

## 2023-05-22 NOTE — CARE COORDINATION
11:55 AM  LVM for [facility]ty[facility] regarding referral.     Electronically signed by Stone Hernandez RN, CM on 5/22/2023 at 11:55 AM.  Phone: [phone]  Fax: [phone]

## 2023-05-22 NOTE — PROGRESS NOTES
Patient flipping in and out of MD KESHA notified. Pt is asymptomatic. No new orders at this time.     Electronically signed by Charlie Brewster RN on 5/22/2023 at 2:39 PM

## 2023-05-22 NOTE — PROGRESS NOTES
Physical Therapy  Facility/Department: [facility]  Physical Therapy Progress Note     Name: Jaki Flores  : 1932  MRN: [id number]  Date of Service: 2023    Discharge Recommendations: Jaki Flores scored a 15/24 on the AM-PAC short mobility form. Current research shows that an AM-PAC score of 17 or less is typically not associated with a discharge to the patient's home setting. Based on the patient's AM-PAC score and their current functional mobility deficits, it is recommended that the patient have 3-5 sessions per week of Physical Therapy at d/c to increase the patient's independence. Please see assessment section for further patient specific details. If patient discharges prior to next session this note will serve as a discharge summary. Please see below for the latest assessment towards goals. Patient Diagnosis(es): The primary encounter diagnosis was Pneumonia of right lower lobe due to infectious organism. Diagnoses of Altered mental status, unspecified altered mental status type and Fall, initial encounter were also pertinent to this visit. Past Medical History:  has a past medical history of CAD (coronary artery disease), Diabetes mellitus ([address] Hyperlipidemia, and Unspecified cerebral artery occlusion with cerebral infarction. Past Surgical History:  has a past surgical history that includes Hysterectomy; joint replacement; Appendectomy; hernia repair; and Cardiac surgery. Assessment   Body Structures, Functions, Activity Limitations Requiring Skilled Therapeutic Intervention: Decreased functional mobility ; Decreased strength;Decreased balance  Assessment: Pt is a 79 y/o female admitted with PNA and sepsis. Pt is from home with daughter and demo mobility below her reported baseline of independent ambulator at home. Pt required min assist and use of RW for safety with amb. Pt appears more alert today and able to amb further distance; 2/3 goals met.  Amb

## 2023-05-22 NOTE — PROGRESS NOTES
Dr. Hina Livingston notified of b/p of 166/87. No new orders at this time, but PO blood pressure medications options will be explored per Dr. Hina Livingston.     Electronically signed by Radha Chaudhry RN on 5/22/2023 at 8:07 AM

## 2023-05-22 NOTE — PROGRESS NOTES
Occupational Therapy  Facility/Department: [address]  Occupational Therapy Treatment Note     Name: Bradford Parsons  : 1932  MRN: [id number]  Date of Service: 2023    Discharge Recommendations:Shanice Coronel scored a 15/24 on the AM-PAC ADL Inpatient form. Current research shows that an AM-PAC score of 17 or less is typically not associated with a discharge to the patient's home setting. Based on the patient's AM-PAC score and their current ADL deficits, it is recommended that the patient have 3-5 sessions per week of Occupational Therapy at d/c to increase the patient's independence. Please see assessment section for further patient specific details. If patient discharges prior to next session this note will serve as a discharge summary. Please see below for the latest assessment towards goals. OT Equipment Recommendations  Equipment Needed: No  Other: defer to d/c setting       Patient Diagnosis(es): The primary encounter diagnosis was Pneumonia of right lower lobe due to infectious organism. Diagnoses of Altered mental status, unspecified altered mental status type and Fall, initial encounter were also pertinent to this visit. Past Medical History:  has a past medical history of CAD (coronary artery disease), Diabetes mellitus ([address] Hyperlipidemia, and Unspecified cerebral artery occlusion with cerebral infarction. Past Surgical History:  has a past surgical history that includes Hysterectomy; joint replacement; Appendectomy; hernia repair; and Cardiac surgery. Treatment Diagnosis: Impaired ADLs, mobility, activity tolerance      Assessment   Performance deficits / Impairments: Decreased functional mobility ; Decreased ADL status; Decreased endurance;Decreased balance;Decreased cognition;Decreased safe awareness;Decreased strength  Assessment: Pt from home with daughter, Pt demo increased alertness and participation. Pt demo functioning well below her stated baseline level.  Pt

## 2023-05-22 NOTE — CARE COORDINATION
Case Management Assessment            Discharge Note                    Date / Time of Note: 5/22/2023 1:35 PM                  Discharge Note Completed by: Caty Reis RN    Patient Name: Moi Solorio   YOB: 1932  Diagnosis: Fall, initial encounter [W19. XXXA]  Sepsis ([facility] [address] [A41.9]  Altered mental status, unspecified altered mental status type [R41.82]  Pneumonia of right lower lobe due to infectious organism [J18.9]   Date / Time: 5/17/2023  5:21 AM    Current PCP: Conrado Salazar MD  Clinic patient: Yes    Hospitalization in the last 30 days: No    Advance Directives:  Code Status: [address] DNR form completed and on chart: Yes    Financial:  Payor: Estrellita Muro / Plan: MEDICARE PART A AND B / Product Type: *No Product type* /      Pharmacy:    [facility] [facility] [place] [phone] - F [phone]  [address] [place] [address]  Phone: [phone] Fax: [phone]    [facility] [address] [place] [address] [phone] - f [phone]  [address] [place] [address]  Phone: [phone] Fax: [phone]      Assistance purchasing medications?: Potential Assistance Purchasing Medications: No  Assistance provided by Case Management: None at this time    Does patient want to participate in local refill/ meds to beds program?:      Meds To Beds General Rules:  1. Can ONLY be done Monday- Friday between 8:30am-5pm  2. Prescription(s) must be in pharmacy by 3pm to be filled same day  3. Copy of patient's insurance/ prescription drug card and patient face sheet must be sent along with the prescription(s)  4. Cost of Rx cannot be added to hospital bill. If financial assistance is needed, please contact unit  or ;  or  CANNOT provide pharmacy voucher for patients co-pays  5.  Patients can then  the prescription on their way out of the hospital

## 2023-05-22 NOTE — PLAN OF CARE
Notified of patient having non-sustained intermittent runs of apparent SVT today so obtained 12-lead EKG and reviewed. Did capture non-sustained run of SVT for several seconds before spontaneously terminating into sinus rhythm. No new symptoms reported and BP has remained stable. Stopped amlodipine and started low dose metoprolol to continue as outpatient. Will check stat BMP to ensure no major electrolyte abnormality.  If electrolytes stable patient should remain medically stable for discharge and outpatient follow-up

## 2023-05-23 LAB
EKG ATRIAL RATE: 83 BPM
EKG DIAGNOSIS: NORMAL
EKG P AXIS: 66 DEGREES
EKG P-R INTERVAL: 154 MS
EKG Q-T INTERVAL: 340 MS
EKG QRS DURATION: 64 MS
EKG QTC CALCULATION (BAZETT): 460 MS
EKG R AXIS: -35 DEGREES
EKG T AXIS: 64 DEGREES
EKG VENTRICULAR RATE: 110 BPM

## 2023-05-23 PROCEDURE — 93010 ELECTROCARDIOGRAM REPORT: CPT | Performed by: INTERNAL MEDICINE

## 2024-02-02 ENCOUNTER — APPOINTMENT (OUTPATIENT)
Dept: CT IMAGING | Age: 89
End: 2024-02-02
Payer: MEDICARE

## 2024-02-02 ENCOUNTER — APPOINTMENT (OUTPATIENT)
Dept: GENERAL RADIOLOGY | Age: 89
End: 2024-02-02
Payer: MEDICARE

## 2024-02-02 ENCOUNTER — HOSPITAL ENCOUNTER (EMERGENCY)
Age: 89
Discharge: HOME | End: 2024-02-02
Attending: EMERGENCY MEDICINE
Payer: MEDICARE

## 2024-02-02 VITALS
HEIGHT: 60 IN | OXYGEN SATURATION: 98 % | WEIGHT: 95.8 LBS | SYSTOLIC BLOOD PRESSURE: 153 MMHG | HEART RATE: 90 BPM | RESPIRATION RATE: 15 BRPM | DIASTOLIC BLOOD PRESSURE: 73 MMHG | TEMPERATURE: 98.1 F | BODY MASS INDEX: 18.81 KG/M2

## 2024-02-02 DIAGNOSIS — H05.239: ICD-10-CM
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
## 2024-02-02 LAB
ALBUMIN SERPL-MCNC: 3.5 G/DL (ref 3.4–5)
ALBUMIN/GLOB SERPL: 1.1 {RATIO} (ref 1.1–2.2)
ALP SERPL-CCNC: 96 U/L (ref 40–129)
ALT SERPL-CCNC: 15 U/L (ref 10–40)
ANION GAP SERPL CALCULATED.3IONS-SCNC: 9 MMOL/L (ref 3–16)
AST SERPL-CCNC: 24 U/L (ref 15–37)
BASOPHILS # BLD: 0.1 K/UL (ref 0–0.2)
BASOPHILS NFR BLD: 0.7 %
BILIRUB SERPL-MCNC: 0.5 MG/DL (ref 0–1)
BILIRUB UR QL STRIP.AUTO: NEGATIVE
BUN SERPL-MCNC: 14 MG/DL (ref 7–20)
CALCIUM SERPL-MCNC: 10.3 MG/DL (ref 8.3–10.6)
CHLORIDE SERPL-SCNC: 103 MMOL/L (ref 99–110)
CLARITY UR: CLEAR
CO2 SERPL-SCNC: 24 MMOL/L (ref 21–32)
COLOR UR: YELLOW
CREAT SERPL-MCNC: 0.8 MG/DL (ref 0.6–1.2)
DEPRECATED RDW RBC AUTO: 14.2 % (ref 12.4–15.4)
EKG ATRIAL RATE: 90 BPM
EKG DIAGNOSIS: NORMAL
EKG P AXIS: 86 DEGREES
EKG P-R INTERVAL: 158 MS
EKG Q-T INTERVAL: 358 MS
EKG QRS DURATION: 74 MS
EKG QTC CALCULATION (BAZETT): 437 MS
EKG R AXIS: 45 DEGREES
EKG T AXIS: 61 DEGREES
EKG VENTRICULAR RATE: 90 BPM
EOSINOPHIL # BLD: 0.1 K/UL (ref 0–0.6)
EOSINOPHIL NFR BLD: 1.2 %
FLUAV RNA RESP QL NAA+PROBE: NOT DETECTED
FLUBV RNA RESP QL NAA+PROBE: NOT DETECTED
GFR SERPLBLD CREATININE-BSD FMLA CKD-EPI: >60 ML/MIN/{1.73_M2}
GLUCOSE BLD-MCNC: 89 MG/DL (ref 70–99)
GLUCOSE SERPL-MCNC: 96 MG/DL (ref 70–99)
GLUCOSE UR STRIP.AUTO-MCNC: 100 MG/DL
HCT VFR BLD AUTO: 34 % (ref 36–48)
HGB BLD-MCNC: 11.4 G/DL (ref 12–16)
HGB UR QL STRIP.AUTO: ABNORMAL
KETONES UR STRIP.AUTO-MCNC: NEGATIVE MG/DL
LEUKOCYTE ESTERASE UR QL STRIP.AUTO: NEGATIVE
LYMPHOCYTES # BLD: 2.7 K/UL (ref 1–5.1)
LYMPHOCYTES NFR BLD: 30.3 %
MCH RBC QN AUTO: 29.7 PG (ref 26–34)
MCHC RBC AUTO-ENTMCNC: 33.4 G/DL (ref 31–36)
MCV RBC AUTO: 88.9 FL (ref 80–100)
MONOCYTES # BLD: 0.6 K/UL (ref 0–1.3)
MONOCYTES NFR BLD: 7.3 %
NEUTROPHILS # BLD: 5.3 K/UL (ref 1.7–7.7)
NEUTROPHILS NFR BLD: 60.5 %
NITRITE UR QL STRIP.AUTO: NEGATIVE
NT-PROBNP SERPL-MCNC: 541 PG/ML (ref 0–449)
PERFORMED ON: NORMAL
PH UR STRIP.AUTO: 7 [PH] (ref 5–8)
PLATELET # BLD AUTO: 300 K/UL (ref 135–450)
PMV BLD AUTO: 7.3 FL (ref 5–10.5)
POTASSIUM SERPL-SCNC: 4.3 MMOL/L (ref 3.5–5.1)
PROT SERPL-MCNC: 6.6 G/DL (ref 6.4–8.2)
PROT UR STRIP.AUTO-MCNC: NEGATIVE MG/DL
RBC # BLD AUTO: 3.83 M/UL (ref 4–5.2)
RBC #/AREA URNS HPF: ABNORMAL /HPF (ref 0–4)
RENAL EPI CELLS #/AREA UR COMP ASSIST: ABNORMAL /HPF (ref 0–1)
SARS-COV-2 RNA RESP QL NAA+PROBE: NOT DETECTED
SODIUM SERPL-SCNC: 136 MMOL/L (ref 136–145)
SP GR UR STRIP.AUTO: 1.01 (ref 1–1.03)
TROPONIN, HIGH SENSITIVITY: 19 NG/L (ref 0–14)
TROPONIN, HIGH SENSITIVITY: 19 NG/L (ref 0–14)
UA DIPSTICK W REFLEX MICRO PNL UR: YES
URN SPEC COLLECT METH UR: ABNORMAL
UROBILINOGEN UR STRIP-ACNC: 0.2 E.U./DL
WBC # BLD AUTO: 8.8 K/UL (ref 4–11)
WBC #/AREA URNS HPF: ABNORMAL /HPF (ref 0–5)

## 2024-02-02 PROCEDURE — 84484 ASSAY OF TROPONIN QUANT: CPT

## 2024-02-02 PROCEDURE — 71045 X-RAY EXAM CHEST 1 VIEW: CPT

## 2024-02-02 PROCEDURE — 72125 CT NECK SPINE W/O DYE: CPT

## 2024-02-02 PROCEDURE — 81001 URINALYSIS AUTO W/SCOPE: CPT

## 2024-02-02 PROCEDURE — 70486 CT MAXILLOFACIAL W/O DYE: CPT

## 2024-02-02 PROCEDURE — 85025 COMPLETE CBC W/AUTO DIFF WBC: CPT

## 2024-02-02 PROCEDURE — 93005 ELECTROCARDIOGRAM TRACING: CPT

## 2024-02-02 PROCEDURE — 70450 CT HEAD/BRAIN W/O DYE: CPT

## 2024-02-02 PROCEDURE — 83880 ASSAY OF NATRIURETIC PEPTIDE: CPT

## 2024-02-02 PROCEDURE — 87636 SARSCOV2 & INF A&B AMP PRB: CPT

## 2024-02-02 PROCEDURE — 80053 COMPREHEN METABOLIC PANEL: CPT

## 2024-02-02 PROCEDURE — 36415 COLL VENOUS BLD VENIPUNCTURE: CPT

## 2024-02-02 PROCEDURE — 6370000000 HC RX 637 (ALT 250 FOR IP)

## 2024-02-02 PROCEDURE — 99285 EMERGENCY DEPT VISIT HI MDM: CPT

## 2024-02-02 RX ORDER — BACITRACIN ZINC AND POLYMYXIN B SULFATE 500; 1000 [USP'U]/G; [USP'U]/G
OINTMENT TOPICAL
Qty: 15 G | Refills: 0 | Status: SHIPPED | OUTPATIENT
Start: 2024-02-02 | End: 2024-02-02

## 2024-02-02 RX ORDER — BACITRACIN ZINC AND POLYMYXIN B SULFATE 500; 1000 [USP'U]/G; [USP'U]/G
OINTMENT TOPICAL ONCE
Status: COMPLETED | OUTPATIENT
Start: 2024-02-02 | End: 2024-02-02

## 2024-02-02 RX ORDER — BACITRACIN ZINC AND POLYMYXIN B SULFATE 500; 1000 [USP'U]/G; [USP'U]/G
OINTMENT TOPICAL
Qty: 15 G | Refills: 0 | Status: SHIPPED | OUTPATIENT
Start: 2024-02-02 | End: 2024-02-09

## 2024-02-02 RX ADMIN — Medication: at 18:28

## 2024-02-02 ASSESSMENT — LIFESTYLE VARIABLES
HOW MANY STANDARD DRINKS CONTAINING ALCOHOL DO YOU HAVE ON A TYPICAL DAY: PATIENT DOES NOT DRINK
HOW OFTEN DO YOU HAVE A DRINK CONTAINING ALCOHOL: NEVER

## 2024-02-02 ASSESSMENT — ENCOUNTER SYMPTOMS
RESPIRATORY NEGATIVE: 1
ALLERGIC/IMMUNOLOGIC NEGATIVE: 1
GASTROINTESTINAL NEGATIVE: 1
EYES NEGATIVE: 1

## 2024-02-02 ASSESSMENT — PAIN - FUNCTIONAL ASSESSMENT: PAIN_FUNCTIONAL_ASSESSMENT: NONE - DENIES PAIN

## 2024-02-02 NOTE — ED PROVIDER NOTES
ED Attending Attestation Note     Date of evaluation: 2/2/2024    This patient was seen by the resident.  I have seen and examined the patient, agree with the workup, evaluation, management and diagnosis. The care plan has been discussed.  I have reviewed the ECG and concur with the resident's interpretation.  My assessment reveals a generally very well-appearing elderly patient, slender and frail, but pleasantly conversational, in no acute distress.  She presents to the emergency department from her assisted living facility after an unwitnessed fall, with a right periorbital hematoma.  The patient has a history of dementia, and does not recall the circumstances of the fall.  She has no complaints of pain.  She does have notable ecchymosis of the upper and lower eyelid, which closes the eye, although she is able to open the eye, and on examination the eye itself has no abnormalities.  She has good extraocular movements.  There appears to be a small scabbed laceration over the right eyebrow, likely the point of impact.  No other evidence of injury on examination.    Given the patient's confusion, lack of recall, evidence of head trauma, age, cross-sectional imaging was performed of the head, C-spine, facial bones, which thankfully show no acute traumatic injury.  As it has been at least 12 and perhaps closer to 24 hours since this injury, the small supraorbital laceration will be cleansed and dressed, but will not be closed with sutures.       Alessia Cobb MD  02/02/24 2506

## 2024-02-02 NOTE — ED PROVIDER NOTES
THE [facility]  EMERGENCY DEPARTMENT ENCOUNTER          EM RESIDENT NOTE       Date of evaluation: 2/2/2024    Chief Complaint     Fall (Pt was brought in from [facility] after a fall from yesterday. Denies being on blood thinners or pain)      History of Present Illness     Shanice Guillen is a 89 y.o. female who presents via EMS from her nursing home with a chief complaint of fall.  Upon presentation the patient states that she thinks she fell sometime last night.  She is unable to recall what happened.  She is not sure if she passed out.  As per the patient she states that she woke up and got back in bed.  Upon waking up today at the nursing home was concerned and sent the patient to the ED for evaluation.  Collateral to the patient's daughter was obtained.  As per the patient's daughter last year she had about 6 falls.  At this time the patient does not endorse having pain or discomfort anywhere in her body.  She does not complain of having dysuria, nausea or vomiting at this time.  No headaches.  No blurry vision or changes in her vision.    MEDICAL DECISION MAKING / ASSESSMENT / PLAN     INITIAL VITALS: BP: (!) 145/72, Temp: 98.1 °F (36.7 °C), Pulse: (!) 102, Respirations: 15, SpO2: 94 %    Shanice Guillen is a 89 y.o. female coming to the ED after a fall sometime last night.  Primary assessments the patient has a GCS of 15.  Upon second reassessment she is noted to have a periorbital hematoma on the right eye.  There is a subcentimeter laceration along the brow line of the right eye.  The patient does not have tenderness along her C-spine, thoracic spine or lumbar spine.  C collar was put in place. She is not tender in the abdomen or in any of her joints.  This patient is able to flex and extend all her extremities with no discomfort.  Patient was placed in a c-collar.  CT head without contrast and CT neck obtained.  CT maxillofacial also obtained.  At this time the patient's vital signs was noted to have mild

## 2024-02-02 NOTE — DISCHARGE INSTRUCTIONS
You were seen in the ED today after a fall.  Your CT imaging of the head and neck did not show any signs of fractures.  You have a hematoma around the right thigh and a small laceration which was washed in the ED.  Your workup did not show any signs of infection could have caused you to fall.  You can use bacitracin for your eyebrow laceration. Please keep the area clean and dry.     Please return to the ED in the event that you develop seizures or syncope.  Please follow-up with your primary care physician within a week for an evaluation/health maintenance and for further management of your recurrent falls

## 2024-02-19 ENCOUNTER — TELEPHONE (OUTPATIENT)
Dept: CASE MANAGEMENT | Age: 89
End: 2024-02-19

## 2024-02-19 NOTE — TELEPHONE ENCOUNTER
Incidental pulmonary nodule finding on CT 2/2/2024 completed at [facility].  Imaging report with follow-up recommendations sent to PCP via [employer].       Alyx BARROSN, RN   Lung Nodule Navigator  [facility]  [phone]

## 2024-11-08 ENCOUNTER — HOSPITAL ENCOUNTER (INPATIENT)
Age: 89
LOS: 10 days | Discharge: NURSING, REHAB OR LONG-TERM CARE | DRG: 542 | End: 2024-11-18
Attending: EMERGENCY MEDICINE | Admitting: INTERNAL MEDICINE
Payer: MEDICARE

## 2024-11-08 ENCOUNTER — APPOINTMENT (OUTPATIENT)
Dept: CT IMAGING | Age: 89
DRG: 542 | End: 2024-11-08
Payer: MEDICARE

## 2024-11-08 ENCOUNTER — APPOINTMENT (OUTPATIENT)
Dept: GENERAL RADIOLOGY | Age: 89
DRG: 542 | End: 2024-11-08
Payer: MEDICARE

## 2024-11-08 DIAGNOSIS — S32.392A: Primary | ICD-10-CM

## 2024-11-08 DIAGNOSIS — R09.02 HYPOXEMIA: ICD-10-CM

## 2024-11-08 PROBLEM — S32.313A CLOSED AVULSION FRACTURE OF ANTERIOR SUPERIOR ILIAC SPINE OF PELVIS (HCC): Status: ACTIVE | Noted: 2024-11-08

## 2024-11-08 LAB
ANION GAP SERPL CALCULATED.3IONS-SCNC: 9 MMOL/L (ref 3–16)
BASOPHILS # BLD: 0.1 K/UL (ref 0–0.2)
BASOPHILS NFR BLD: 0.9 %
BUN SERPL-MCNC: 14 MG/DL (ref 7–20)
CALCIUM SERPL-MCNC: 9.4 MG/DL (ref 8.3–10.6)
CHLORIDE SERPL-SCNC: 101 MMOL/L (ref 99–110)
CO2 SERPL-SCNC: 24 MMOL/L (ref 21–32)
CREAT SERPL-MCNC: 0.8 MG/DL (ref 0.6–1.2)
D-DIMER QUANTITATIVE: 11.16 UG/ML FEU (ref 0–0.6)
DEPRECATED RDW RBC AUTO: 14.6 % (ref 12.4–15.4)
EKG ATRIAL RATE: 71 BPM
EKG DIAGNOSIS: NORMAL
EKG P AXIS: 78 DEGREES
EKG P-R INTERVAL: 148 MS
EKG Q-T INTERVAL: 412 MS
EKG QRS DURATION: 68 MS
EKG QTC CALCULATION (BAZETT): 447 MS
EKG R AXIS: 32 DEGREES
EKG T AXIS: 33 DEGREES
EKG VENTRICULAR RATE: 71 BPM
EOSINOPHIL # BLD: 0 K/UL (ref 0–0.6)
EOSINOPHIL NFR BLD: 0.1 %
FLUAV RNA RESP QL NAA+PROBE: NOT DETECTED
FLUBV RNA RESP QL NAA+PROBE: NOT DETECTED
GFR SERPLBLD CREATININE-BSD FMLA CKD-EPI: 69 ML/MIN/{1.73_M2}
GLUCOSE SERPL-MCNC: 134 MG/DL (ref 70–99)
HCT VFR BLD AUTO: 34.2 % (ref 36–48)
HGB BLD-MCNC: 11.2 G/DL (ref 12–16)
LYMPHOCYTES # BLD: 0.6 K/UL (ref 1–5.1)
LYMPHOCYTES NFR BLD: 10.8 %
MCH RBC QN AUTO: 29 PG (ref 26–34)
MCHC RBC AUTO-ENTMCNC: 32.9 G/DL (ref 31–36)
MCV RBC AUTO: 88.1 FL (ref 80–100)
MONOCYTES # BLD: 0.5 K/UL (ref 0–1.3)
MONOCYTES NFR BLD: 8.1 %
NEUTROPHILS # BLD: 4.7 K/UL (ref 1.7–7.7)
NEUTROPHILS NFR BLD: 80.1 %
NT-PROBNP SERPL-MCNC: 639 PG/ML (ref 0–449)
PLATELET # BLD AUTO: 197 K/UL (ref 135–450)
PMV BLD AUTO: 7.7 FL (ref 5–10.5)
POTASSIUM SERPL-SCNC: 4.2 MMOL/L (ref 3.5–5.1)
RBC # BLD AUTO: 3.88 M/UL (ref 4–5.2)
SARS-COV-2 RNA RESP QL NAA+PROBE: NOT DETECTED
SODIUM SERPL-SCNC: 134 MMOL/L (ref 136–145)
TROPONIN, HIGH SENSITIVITY: 17 NG/L (ref 0–14)
TROPONIN, HIGH SENSITIVITY: 17 NG/L (ref 0–14)
WBC # BLD AUTO: 5.8 K/UL (ref 4–11)

## 2024-11-08 PROCEDURE — 2580000003 HC RX 258: Performed by: STUDENT IN AN ORGANIZED HEALTH CARE EDUCATION/TRAINING PROGRAM

## 2024-11-08 PROCEDURE — 6360000002 HC RX W HCPCS: Performed by: STUDENT IN AN ORGANIZED HEALTH CARE EDUCATION/TRAINING PROGRAM

## 2024-11-08 PROCEDURE — 87636 SARSCOV2 & INF A&B AMP PRB: CPT

## 2024-11-08 PROCEDURE — 84484 ASSAY OF TROPONIN QUANT: CPT

## 2024-11-08 PROCEDURE — 71045 X-RAY EXAM CHEST 1 VIEW: CPT

## 2024-11-08 PROCEDURE — 85379 FIBRIN DEGRADATION QUANT: CPT

## 2024-11-08 PROCEDURE — 93005 ELECTROCARDIOGRAM TRACING: CPT | Performed by: EMERGENCY MEDICINE

## 2024-11-08 PROCEDURE — 71275 CT ANGIOGRAPHY CHEST: CPT

## 2024-11-08 PROCEDURE — 73700 CT LOWER EXTREMITY W/O DYE: CPT

## 2024-11-08 PROCEDURE — 83880 ASSAY OF NATRIURETIC PEPTIDE: CPT

## 2024-11-08 PROCEDURE — 73502 X-RAY EXAM HIP UNI 2-3 VIEWS: CPT

## 2024-11-08 PROCEDURE — 2580000003 HC RX 258: Performed by: INTERNAL MEDICINE

## 2024-11-08 PROCEDURE — 80048 BASIC METABOLIC PNL TOTAL CA: CPT

## 2024-11-08 PROCEDURE — 6370000000 HC RX 637 (ALT 250 FOR IP): Performed by: EMERGENCY MEDICINE

## 2024-11-08 PROCEDURE — 85025 COMPLETE CBC W/AUTO DIFF WBC: CPT

## 2024-11-08 PROCEDURE — 1200000000 HC SEMI PRIVATE

## 2024-11-08 PROCEDURE — 99285 EMERGENCY DEPT VISIT HI MDM: CPT

## 2024-11-08 PROCEDURE — 6360000004 HC RX CONTRAST MEDICATION: Performed by: STUDENT IN AN ORGANIZED HEALTH CARE EDUCATION/TRAINING PROGRAM

## 2024-11-08 PROCEDURE — 6370000000 HC RX 637 (ALT 250 FOR IP): Performed by: INTERNAL MEDICINE

## 2024-11-08 RX ORDER — SODIUM CHLORIDE 0.9 % (FLUSH) 0.9 %
5-40 SYRINGE (ML) INJECTION PRN
Status: DISCONTINUED | OUTPATIENT
Start: 2024-11-08 | End: 2024-11-18 | Stop reason: HOSPADM

## 2024-11-08 RX ORDER — MIRTAZAPINE 15 MG/1
15 TABLET, FILM COATED ORAL NIGHTLY
Status: DISCONTINUED | OUTPATIENT
Start: 2024-11-08 | End: 2024-11-18 | Stop reason: HOSPADM

## 2024-11-08 RX ORDER — CALCIUM CARBONATE 500 MG/1
1 TABLET, CHEWABLE ORAL 2 TIMES DAILY
COMMUNITY

## 2024-11-08 RX ORDER — ACETAMINOPHEN 325 MG/1
650 TABLET ORAL EVERY 4 HOURS PRN
COMMUNITY

## 2024-11-08 RX ORDER — IOPAMIDOL 755 MG/ML
75 INJECTION, SOLUTION INTRAVASCULAR
Status: COMPLETED | OUTPATIENT
Start: 2024-11-08 | End: 2024-11-08

## 2024-11-08 RX ORDER — HYDROMORPHONE HYDROCHLORIDE 1 MG/ML
0.5 INJECTION, SOLUTION INTRAMUSCULAR; INTRAVENOUS; SUBCUTANEOUS EVERY 4 HOURS PRN
Status: DISCONTINUED | OUTPATIENT
Start: 2024-11-08 | End: 2024-11-18 | Stop reason: HOSPADM

## 2024-11-08 RX ORDER — SODIUM CHLORIDE 9 MG/ML
INJECTION, SOLUTION INTRAVENOUS PRN
Status: DISCONTINUED | OUTPATIENT
Start: 2024-11-08 | End: 2024-11-18 | Stop reason: HOSPADM

## 2024-11-08 RX ORDER — POTASSIUM CHLORIDE 1500 MG/1
40 TABLET, EXTENDED RELEASE ORAL PRN
Status: ACTIVE | OUTPATIENT
Start: 2024-11-08 | End: 2024-11-09

## 2024-11-08 RX ORDER — MAGNESIUM SULFATE IN WATER 40 MG/ML
2000 INJECTION, SOLUTION INTRAVENOUS PRN
Status: DISCONTINUED | OUTPATIENT
Start: 2024-11-08 | End: 2024-11-10

## 2024-11-08 RX ORDER — DIPHENHYDRAMINE HYDROCHLORIDE 50 MG/ML
50 INJECTION INTRAMUSCULAR; INTRAVENOUS ONCE
Status: COMPLETED | OUTPATIENT
Start: 2024-11-08 | End: 2024-11-08

## 2024-11-08 RX ORDER — ENOXAPARIN SODIUM 100 MG/ML
30 INJECTION SUBCUTANEOUS DAILY
Status: DISCONTINUED | OUTPATIENT
Start: 2024-11-10 | End: 2024-11-10

## 2024-11-08 RX ORDER — LISINOPRIL 10 MG/1
10 TABLET ORAL DAILY
Status: DISCONTINUED | OUTPATIENT
Start: 2024-11-09 | End: 2024-11-15

## 2024-11-08 RX ORDER — ACETAMINOPHEN 500 MG
1000 TABLET ORAL ONCE
Status: COMPLETED | OUTPATIENT
Start: 2024-11-08 | End: 2024-11-08

## 2024-11-08 RX ORDER — ENOXAPARIN SODIUM 100 MG/ML
1 INJECTION SUBCUTANEOUS ONCE
Status: COMPLETED | OUTPATIENT
Start: 2024-11-08 | End: 2024-11-08

## 2024-11-08 RX ORDER — OXYCODONE HYDROCHLORIDE 5 MG/1
2.5 TABLET ORAL EVERY 4 HOURS PRN
Status: DISCONTINUED | OUTPATIENT
Start: 2024-11-08 | End: 2024-11-11

## 2024-11-08 RX ORDER — LEVOTHYROXINE SODIUM 137 UG/1
137 TABLET ORAL DAILY
Status: DISCONTINUED | OUTPATIENT
Start: 2024-11-09 | End: 2024-11-18 | Stop reason: HOSPADM

## 2024-11-08 RX ORDER — ONDANSETRON 4 MG/1
4 TABLET, ORALLY DISINTEGRATING ORAL EVERY 8 HOURS PRN
Status: DISCONTINUED | OUTPATIENT
Start: 2024-11-08 | End: 2024-11-18 | Stop reason: HOSPADM

## 2024-11-08 RX ORDER — POTASSIUM CHLORIDE 7.45 MG/ML
10 INJECTION INTRAVENOUS PRN
Status: ACTIVE | OUTPATIENT
Start: 2024-11-08 | End: 2024-11-09

## 2024-11-08 RX ORDER — ONDANSETRON 2 MG/ML
4 INJECTION INTRAMUSCULAR; INTRAVENOUS EVERY 6 HOURS PRN
Status: DISCONTINUED | OUTPATIENT
Start: 2024-11-08 | End: 2024-11-18 | Stop reason: HOSPADM

## 2024-11-08 RX ORDER — SODIUM CHLORIDE 0.9 % (FLUSH) 0.9 %
5-40 SYRINGE (ML) INJECTION EVERY 12 HOURS SCHEDULED
Status: DISCONTINUED | OUTPATIENT
Start: 2024-11-08 | End: 2024-11-18 | Stop reason: HOSPADM

## 2024-11-08 RX ORDER — ACETAMINOPHEN 325 MG/1
650 TABLET ORAL EVERY 6 HOURS PRN
Status: DISCONTINUED | OUTPATIENT
Start: 2024-11-08 | End: 2024-11-18 | Stop reason: HOSPADM

## 2024-11-08 RX ORDER — MIRTAZAPINE 15 MG/1
15 TABLET, FILM COATED ORAL NIGHTLY
COMMUNITY

## 2024-11-08 RX ORDER — ACETAMINOPHEN 650 MG/1
650 SUPPOSITORY RECTAL EVERY 6 HOURS PRN
Status: DISCONTINUED | OUTPATIENT
Start: 2024-11-08 | End: 2024-11-18 | Stop reason: HOSPADM

## 2024-11-08 RX ORDER — POLYETHYLENE GLYCOL 3350 17 G/17G
17 POWDER, FOR SOLUTION ORAL DAILY PRN
Status: DISCONTINUED | OUTPATIENT
Start: 2024-11-08 | End: 2024-11-18 | Stop reason: HOSPADM

## 2024-11-08 RX ADMIN — ENOXAPARIN SODIUM 50 MG: 100 INJECTION SUBCUTANEOUS at 20:35

## 2024-11-08 RX ADMIN — IOPAMIDOL 75 ML: 755 INJECTION, SOLUTION INTRAVENOUS at 20:18

## 2024-11-08 RX ADMIN — ACETAMINOPHEN 1000 MG: 500 TABLET, FILM COATED ORAL at 13:12

## 2024-11-08 RX ADMIN — MIRTAZAPINE 15 MG: 15 TABLET, FILM COATED ORAL at 22:14

## 2024-11-08 RX ADMIN — METHYLPREDNISOLONE SODIUM SUCCINATE 40 MG: 40 INJECTION INTRAMUSCULAR; INTRAVENOUS at 18:56

## 2024-11-08 RX ADMIN — DIPHENHYDRAMINE HYDROCHLORIDE 50 MG: 50 INJECTION INTRAMUSCULAR; INTRAVENOUS at 18:57

## 2024-11-08 RX ADMIN — SODIUM CHLORIDE, PRESERVATIVE FREE 10 ML: 5 INJECTION INTRAVENOUS at 22:35

## 2024-11-08 ASSESSMENT — PAIN SCALES - GENERAL: PAINLEVEL_OUTOF10: 10

## 2024-11-08 ASSESSMENT — PAIN DESCRIPTION - ORIENTATION: ORIENTATION: LEFT

## 2024-11-08 ASSESSMENT — PAIN DESCRIPTION - LOCATION: LOCATION: HIP

## 2024-11-08 NOTE — ED PROVIDER NOTES
THE [facility]  EMERGENCY DEPARTMENT ENCOUNTER          ATTENDING PHYSICIAN NOTE       Date of evaluation: 11/8/2024    ADDENDUM:      Care of this patient was assumed from Dr. Montes.  The patient was seen for Fall  .  The patient's initial evaluation and plan have been discussed with the prior provider who initially evaluated the patient. Please see the original HPI and MDM for full details. Nursing Notes, Past Medical Hx, Past Surgical Hx, Social Hx, Allergies, and Family Hx were all reviewed.    Diagnostic Results     EKG   Interpreted by: MD Simon  Sinus rhythm, ventricular rate 71.  Intervals normal, axis normal.  Subtle upsloping ST changes without hiren elevation as compared to TP segments in multiple leads.  Compared to previous from 2/2/2024, not significantly changed.    RADIOLOGY:  CTA Chest W/ Contrast R/O PE   Final Result         1. No pulmonary embolism   2. Extensive opacification bronchus intermedius and bilateral lower lobe bronchi most compatible with large mucous secretions with associated resorptive atelectasis involving portions of lower lobes. Superimposed pneumonia not excluded.      ----------PERT DATA----------      Data reported only if pulmonary embolism is present:      Most central extent of clot:   NA      RV/LV ratio:   NA      ----------PERT DATA----------         Electronically signed by Mustapha Jacques MD      CT HIP LEFT WO CONTRAST   Final Result      1.  Acute nondisplaced fracture at the anterior superior iliac spine.   2.  Prominent expansile osteolysis and significant loss of bone stock involving   the proximal one third of the femur and large area of pseudotumor, compatible   with particle disease. Question mild osteolysis about the acetabular cup.            Electronically signed by Minor Helms      XR CHEST PORTABLE   Final Result   No acute cardiopulmonary abnormality.      Electronically signed by Romeo Lopez      XR HIP 2-3 VW W PELVIS LEFT   Final Result   Status

## 2024-11-08 NOTE — ED NOTES
How does patient ambulate?   []Low Fall Risk (ambulates by themselves without support)  []Stand by assist   []Contact Guard   []Front wheel walker  []Wheelchair   []Steady  [x]Bed bound  []History of Lower Extremity Amputation  []Unknown, did not assess in the emergency department   How does patient take pills?  [x]Whole with Water  []Crushed in applesauce  []Crushed in pudding  []Other  []Unknown no oral medications were given in the ED  Is patient alert?   [x]Alert  []Drowsy but responds to voice  []Doesn't respond to voice but responds to painful stimuli  []Unresponsive  Is patient oriented?   [x]To person  [x]To place  [x]To time  [x]To situation  []Confused  []Agitated  []Follows commands  If patient is disoriented or from a Skill Nursing Facility has family been notified of admission?   []Yes   []No  Patient belongings?   [x]Cell phone  []Wallet   []Dentures  [x]Clothing  Any specific patient or family belongings/needs/dynamics?   Fracture of L femur  Miscellaneous comments/pending orders?      If there are any additional questions please reach out to the Emergency Department.            Cason, Shania, RN  11/08/24 4332

## 2024-11-08 NOTE — ED NOTES
Clinical Pharmacy Consult Note  Medication History     Admit Date: 11/8/2024      List of current medications patient is taking is complete. Home Medication list in EPIC updated to reflect changes noted below.    Source of information: Nursing facility med list    Changes made to medication list:   Medications removed: (include reason, ex: therapy completed, patient no longer taking, etc.)  Aspirin - no longer taking  Atorvastatin - no longer taking  Calcium + vit D - no longer taking  Lantus - no longer taking  Ipratropium-albuterol - no longer taking  Ondansetron - no longer taking  Medications added:   Acetaminophen  Calcium carbonate  Mirtazapine  Medication doses adjusted:   Levothyroxine 62 mcg -> 137 mcg  Other notes:   As complete as possible from nursing facility medication list    Current Outpatient Medications   Medication Instructions    acetaminophen (TYLENOL) 650 mg, Oral, EVERY 4 HOURS PRN    calcium carbonate (TUMS) 500 MG chewable tablet 1 tablet, Oral, 2 TIMES DAILY    Cholecalciferol (VITAMIN D3) 50 MCG (2000 UT) CAPS 1 capsule, Oral, DAILY    levothyroxine (SYNTHROID) 137 mcg, Oral, DAILY,      lisinopril (PRINIVIL;ZESTRIL) 10 mg, Oral, DAILY,      mirtazapine (REMERON) 15 mg, Oral, NIGHTLY         Please call with any questions,  Marta Bergman PharmD  PGY1 Pharmacy Resident  Wireless: [phone]  11/8/2024 1:32 PM

## 2024-11-08 NOTE — H&P
Tobacco Use    Smoking status: Every Day     Current packs/day: 0.25     Types: Cigarettes   Substance and Sexual Activity    Alcohol use: No     Alcohol/week: 0.0 standard drinks of alcohol    Drug use: No     Social Determinants of Health      [facility]    Food Insecurities    [facility] from [facility]    Transportation    [facility] from [facility]    Interpersonal Safety    [facility]    Housing/Utilities       Medications:   Medications: Reviewed   methylPREDNISolone  40 mg IntraVENous Once    methylPREDNISolone  40 mg IntraVENous Q4H    diphenhydrAMINE  50 mg IntraVENous Once    enoxaparin  1 mg/kg SubCUTAneous Once      Infusions: None  PRN Meds:  Refer to orders    Labs      CBC:   Recent Labs     11/08/24  1617   WBC 5.8   HGB 11.2*        BMP:    Recent Labs     11/08/24  1618   *   K 4.2      CO2 24   BUN 14   CREATININE 0.8   GLUCOSE 134*     Hepatic: No results for input(s): \"AST\", \"ALT\", \"BILITOT\", \"ALKPHOS\" in the last 72 hours.    Invalid input(s): \"ALB\"  Lipids: No results found for: \"CHOL\", \"HDL\", \"TRIG\"  Hemoglobin A1C:   Lab Results   Component Value Date/Time    LABA1C 8.3 05/17/2023 03:49 PM     TSH:   Lab Results   Component Value Date/Time    TSH 3.86 05/17/2023 05:50 PM     Troponin: No results found for: \"TROPONINT\"  Lactic Acid: No results for input(s): \"LACTA\" in the last 72 hours.  BNP:   Recent Labs     11/08/24  1618   PROBNP 639*     UA:  Lab Results   Component Value Date/Time    NITRU Negative 02/02/2024 05:41 PM    COLORU Yellow 02/02/2024 05:41 PM    PHUR 7.0 02/02/2024 05:41 PM    WBCUA 0-2 02/02/2024 05:41 PM    RBCUA 0-2 02/02/2024 05:41 PM  osteolysis and significant loss of bone stock involving the proximal one third of the femur and large area of pseudotumor, compatible with particle disease. Question mild osteolysis about the acetabular cup. Electronically signed by Minor Helms    XR HIP 2-3 VW W PELVIS LEFT    Result Date: 11/8/2024  XR HIP 2-3 VW W PELVIS LEFT Indication: fall, L hip pain COMPARISON: None Findings: 2 frontal views of the pelvis along with a frog-leg lateral view left hip were obtained. There are bilateral total hip arthroplasties. The major bony structures are properly aligned and the hardware intact. No superimposed acute fracture or osseous abnormality. The bones are osteopenic. The regional soft tissues are unremarkable.     Status post bilateral total hip arthroplasty. No acute abnormality. Electronically signed by Romeo Lopez    XR CHEST PORTABLE    Result Date: 11/8/2024  XR CHEST PORTABLE Indication: fall chest pain COMPARISON: None Findings: Portable AP upright view of the chest was obtained. The heart is normal in size. The lungs are clear. No effusion or pneumothorax. No displaced rib fracture.     No acute cardiopulmonary abnormality. Electronically signed by Romeo Lopez        Electronically signed by Santos Cavanaugh MD on 11/8/2024 at 6:20 PM

## 2024-11-08 NOTE — ED NOTES
RN attempted to ambulate pt, but she was unable to bear weight on L leg d/t pain. RN can attempt again in 10-15 minutes when Tylenol begins to kick in.      Shania Cason, TOMMIE  11/08/24 8409

## 2024-11-08 NOTE — ED PROVIDER NOTES
tenderness with elevation of the leg and flexion of the hip.  No other bony tenderness of the appendicular skeleton. No midline T or L-spine tenderness or step-offs.  Neurological: Alert and appropriately interactive. Face symmetric, speech without dysarthria or obvious aphasia. Moving all extremities spontaneously.   Skin: Warm, dry. No rash. No diaphoresis or erythema.  Psychiatric: Calm and cooperative with appropriate mood and affect.       ASSESSMENT / PLAN  (MEDICAL DECISION MAKING)   Shanice Guillen is a 89 y.o. female who presents emergency department today for a fall.  On arrival, she is in no distress, is moderately hypertensive but has otherwise reassuring vital signs.  On exam she is some ecchymosis over the left ASIS with focal tenderness, no obvious deformity of the left lower extremity otherwise.  She is neurovascularly intact distally.  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  Discussed with orthopedics, who recommended no acute intervention further in the ED but are in agreement with admission for focused PT/OT and pain management.    Separately, the patient has been persistently hypoxic requiring 2 L nasal cannula.  Initially felt to be due to poor pleth on the oximeter, however with reliable waveform she is saturating 85-88%.  Chest x-ray is unremarkable, broad labs have been sent for further evaluation and are pending at signout.  Patient signed out to the oncoming physician, Dr. Diop, who will follow-up on these results and determine the patient's ultimate disposition.    Is this patient to be included in the SEP-1 core measure? No Exclusion criteria - the patient is NOT to be included for SEP-1 Core Measure due to: Infection is not suspected    Medical  ASPIRIN 81 MG CHEWABLE TABLET    Take 1 tablet by mouth daily    ATORVASTATIN (LIPITOR) 40 MG TABLET    Take 1 tablet by mouth daily    CALCIUM CARBONATE-VITAMIN D 600-3.125 MG-MCG TABS    Take by mouth 2 times daily    CHOLECALCIFEROL (VITAMIN D3) 50 MCG (2000 UT) CAPS    Take 1 capsule by mouth daily    INSULIN GLARGINE (LANTUS) 100 UNIT/ML INJECTION VIAL    Inject 8 Units into the skin nightly    IPRATROPIUM 0.5 MG-ALBUTEROL 2.5 MG (DUONEB) 0.5-2.5 (3) MG/3ML SOLN NEBULIZER SOLUTION    Inhale 3 mLs into the lungs every 4 hours as needed for Shortness of Breath or Wheezing    LEVOTHYROXINE (SYNTHROID) 125 MCG TABLET    Take 0.5 tablets by mouth Daily    LISINOPRIL (PRINIVIL;ZESTRIL) 10 MG TABLET    Take 1 tablet by mouth daily    METOPROLOL TARTRATE (LOPRESSOR) 25 MG TABLET    Take 0.5 tablets by mouth 2 times daily    ONDANSETRON (ZOFRAN-ODT) 4 MG DISINTEGRATING TABLET    Take 1 tablet by mouth every 8 hours as needed for Nausea or Vomiting       Allergies     She is allergic to codeine, iv dye [iodides], and pcn [penicillins].                   Narendra Montes MD  11/08/24 0788

## 2024-11-08 NOTE — ED NOTES
Pt dropped to 84% on RA. Supplemental o2 applied via nasal cannula at 2L/min. RN undressed pt and put in goan. RN noticed a small area of ecchymosis on L hip.      Shania Cason, RN  11/08/24 1210       Shania Cason, RN  11/08/24 1211

## 2024-11-08 NOTE — ED NOTES
Pt able to stand at bedside with walker, but unable to take full step. She continually touches L hip and rubs it.      Shania Cason, RN  11/08/24 1400

## 2024-11-09 PROCEDURE — 6360000002 HC RX W HCPCS: Performed by: INTERNAL MEDICINE

## 2024-11-09 PROCEDURE — 1200000000 HC SEMI PRIVATE

## 2024-11-09 PROCEDURE — 2580000003 HC RX 258: Performed by: INTERNAL MEDICINE

## 2024-11-09 PROCEDURE — 6370000000 HC RX 637 (ALT 250 FOR IP): Performed by: INTERNAL MEDICINE

## 2024-11-09 RX ADMIN — OXYCODONE 2.5 MG: 5 TABLET ORAL at 13:03

## 2024-11-09 RX ADMIN — OXYCODONE 2.5 MG: 5 TABLET ORAL at 20:42

## 2024-11-09 RX ADMIN — HYDROMORPHONE HYDROCHLORIDE 0.5 MG: 1 INJECTION, SOLUTION INTRAMUSCULAR; INTRAVENOUS; SUBCUTANEOUS at 22:03

## 2024-11-09 RX ADMIN — OXYCODONE 2.5 MG: 5 TABLET ORAL at 01:39

## 2024-11-09 RX ADMIN — SODIUM CHLORIDE, PRESERVATIVE FREE 10 ML: 5 INJECTION INTRAVENOUS at 11:04

## 2024-11-09 RX ADMIN — SODIUM CHLORIDE, PRESERVATIVE FREE 10 ML: 5 INJECTION INTRAVENOUS at 22:09

## 2024-11-09 RX ADMIN — MIRTAZAPINE 15 MG: 15 TABLET, FILM COATED ORAL at 20:43

## 2024-11-09 RX ADMIN — LEVOTHYROXINE SODIUM 137 MCG: 0.14 TABLET ORAL at 05:47

## 2024-11-09 ASSESSMENT — PAIN DESCRIPTION - FREQUENCY
FREQUENCY: INTERMITTENT
FREQUENCY: INTERMITTENT
FREQUENCY: CONTINUOUS
FREQUENCY: CONTINUOUS

## 2024-11-09 ASSESSMENT — PAIN SCALES - WONG BAKER
WONGBAKER_NUMERICALRESPONSE: HURTS WHOLE LOT
WONGBAKER_NUMERICALRESPONSE: HURTS EVEN MORE
WONGBAKER_NUMERICALRESPONSE: NO HURT

## 2024-11-09 ASSESSMENT — PAIN SCALES - GENERAL
PAINLEVEL_OUTOF10: 0
PAINLEVEL_OUTOF10: 1
PAINLEVEL_OUTOF10: 1
PAINLEVEL_OUTOF10: 8
PAINLEVEL_OUTOF10: 4
PAINLEVEL_OUTOF10: 8
PAINLEVEL_OUTOF10: 4
PAINLEVEL_OUTOF10: 6
PAINLEVEL_OUTOF10: 8

## 2024-11-09 ASSESSMENT — PAIN DESCRIPTION - LOCATION
LOCATION: HIP

## 2024-11-09 ASSESSMENT — PAIN DESCRIPTION - PAIN TYPE
TYPE: ACUTE PAIN

## 2024-11-09 ASSESSMENT — PAIN - FUNCTIONAL ASSESSMENT
PAIN_FUNCTIONAL_ASSESSMENT: ACTIVITIES ARE NOT PREVENTED

## 2024-11-09 ASSESSMENT — PAIN DESCRIPTION - ORIENTATION
ORIENTATION: LEFT

## 2024-11-09 ASSESSMENT — PAIN DESCRIPTION - DESCRIPTORS
DESCRIPTORS: ACHING

## 2024-11-09 ASSESSMENT — PAIN DESCRIPTION - ONSET
ONSET: ON-GOING

## 2024-11-09 NOTE — PROGRESS NOTES
4 Eyes Skin Assessment     NAME:  Shanice Guillen  YOB: 1932  MEDICAL RECORD NUMBER:  [id number]    The patient is being assessed for  Admission    I agree that at least one RN has performed a thorough Head to Toe Skin Assessment on the patient. ALL assessment sites listed below have been assessed.      Areas assessed by both nurses:    Head, Face, Ears, Shoulders, Back, Chest, Arms, Elbows, Hands, Sacrum. Buttock, Coccyx, Ischium, Legs. Feet and Heels, Under Medical Devices , and Other      Stage II on coccyx with nonblanchable erythema surrounding, Stage II on L elbow with nonblanchable erythema surrounding, Skin plaques on back, various red spots on extremities, generalized ecchymosis, ecchymosis on L hip       Does the Patient have a Wound? Yes wound(s) were present on assessment. LDA wound assessment was Initiated and completed by RN       Dyllan Prevention initiated by RN: Yes  Wound Care Orders initiated by RN: Yes    Pressure Injury (Stage 3,4, Unstageable, DTI, NWPT, and Complex wounds) if present, place Wound referral order by RN under : No    New Ostomies, if present place, Ostomy referral order under : No     Nurse 1 eSignature: Electronically signed by TABITHA MICHELLE RN on 11/8/24 at 9:34 PM EST    **SHARE this note so that the co-signing nurse can place an eSignature**    Nurse 2 eSignature: Electronically signed by Patricia Walker RN on 11/9/24 at 10:29 PM EST

## 2024-11-09 NOTE — PROGRESS NOTES
Pt A&Ox2-3 to self, place situation, disoriented to time, pt has dementia at baseline. VSS 2L, BP elevated. Pt remained in bed during shift, pt refused to get up. Pt voiding and tolerating PO fluids. Managing pain with PRN pain meds per MAR. Fall precautions in place. Bed alarm on and in lowest position. Plan of care continues.

## 2024-11-09 NOTE — PROGRESS NOTES
Pt awake in bed, alert and oriented to person and situation, disoriented to time and place. VSS on 2L NC, pt exhibiting no s/s acute distress at this time. Pt tolerating reg diet well. Pt voiding via external catheter. Pt pain managed via MAR. Standard safety precautions in place, bed locked and in lowest position, bed/chair alarm on, gripper socks applied, bedside table/call light within reach, pt oriented to fall prevention measures. Video monitoring utilized to promote safety. Pt states she Has no other needs at this time. Plan of care to continue.    Electronically signed by TABITHA MICHELLE RN on 11/9/2024 at 2:42 AM

## 2024-11-09 NOTE — PLAN OF CARE
Problem: Safety - Adult  Goal: Free from fall injury  Outcome: Progressing   Fall precautions in place. Bed alarm on and in lowest position. Call light, belongings, bedside table within reach.     Problem: Pain  Goal: Verbalizes/displays adequate comfort level or baseline comfort level  Outcome: Progressing   Assessing pain using numeric pain scale. Managing pain with meds per MAR. Using repositioining and pillow support for comfort.

## 2024-11-09 NOTE — PLAN OF CARE
Problem: Chronic Conditions and Co-morbidities  Goal: Patient's chronic conditions and co-morbidity symptoms are monitored and maintained or improved  Outcome: Progressing     Pt chronic conditions managed via MAR    Problem: Discharge Planning  Goal: Discharge to home or other facility with appropriate resources  Outcome: Progressing     Collaboration with social work to meet pt discharge needs    Problem: Safety - Adult  Goal: Free from fall injury  Outcome: Progressing     Standard safety precautions in place, bed locked and in lowest position, bed/chair alarm on, bedside table/call light within reach, gripper socks applied, pt oriented to fall prevention measures. Video monitoring utilized to promote safety.

## 2024-11-09 NOTE — ED NOTES
Called upstairs updating them that pt will be up after CT. Pt needed to be premedicated d/t allergy to contrast dye. Messaged pharmacy about missing Lovenox.      Shania Cason, RN  11/08/24 1938       Shania Cason, RN  11/08/24 1939

## 2024-11-09 NOTE — PROGRESS NOTES
Hospital Medicine Progress Note      Date of Admission: 11/8/2024  Hospital Day: 2    Chief Admission Complaint:  fall, witnessed to be mechanical     Subjective:  Patient is without complaints. No adverse interval events.  Denying pain.     Presenting Admission History:       Shanice Guillen is a 89 y.o. female with pmh as mentioned above presents from her nursing care facility after a witnessed fall earlier today.  History is limited and obtained from EMS primarily-patient has tripped and fallen which was witnessed by the nursing home staff.  Patient did not strike her head or lose consciousness.  Patient is not on any anticoagulation or antiplatelets.  Patient has reportedly complaining of left hip pain prior to transfer to ED.  Patient denies any pain currently and feels well.  Denies lightheadedness or dizziness prior to the fall    Assessment/Plan:      Current Principal Problem:  Closed avulsion fracture of anterior superior iliac spine of pelvis (HCC)    #Witnessed mechanical fall  #Left hip pain  #CT left hip with acute nondisplaced fracture of the anterior superior iliac spine  -Pain relief as ordered  -Orthopedic surgery to see patient today  -PT/OT after seen by orthopedic surgery  -Social service and case management consults     #Elevated D-dimers  #Hypoxia requiring 2 L of supplemental oxygen  #Elevated NT proBNP, no prior echocardiogram available to review  #Elevated high-sensitivity troponin-stable at 17 x 2.  Seems like her baseline  -Patient given therapeutic dose of Lovenox in ED  -Premedication as patient has allergy to IV contrast  -CTPA to rule out acute pulmonary embolism given unexplained hypoxia and elevated D-dimers  -If acute pulmonary embolism ruled out will transition to prophylactic doses of Lovenox with 30 mg subcu daily starting on 11/10 AM     #Chronic medical conditions as mentioned in PMH  -Will continue nursing home medications appropriately  -Lisinopril held as patient will be

## 2024-11-10 LAB
ANION GAP SERPL CALCULATED.3IONS-SCNC: 10 MMOL/L (ref 3–16)
BASOPHILS # BLD: 0 K/UL (ref 0–0.2)
BASOPHILS NFR BLD: 0.3 %
BUN SERPL-MCNC: 20 MG/DL (ref 7–20)
CALCIUM SERPL-MCNC: 8.8 MG/DL (ref 8.3–10.6)
CHLORIDE SERPL-SCNC: 100 MMOL/L (ref 99–110)
CO2 SERPL-SCNC: 22 MMOL/L (ref 21–32)
CREAT SERPL-MCNC: 0.9 MG/DL (ref 0.6–1.2)
DEPRECATED RDW RBC AUTO: 15 % (ref 12.4–15.4)
EOSINOPHIL # BLD: 0 K/UL (ref 0–0.6)
EOSINOPHIL NFR BLD: 0.1 %
GFR SERPLBLD CREATININE-BSD FMLA CKD-EPI: 60 ML/MIN/{1.73_M2}
GLUCOSE SERPL-MCNC: 101 MG/DL (ref 70–99)
HCT VFR BLD AUTO: 39.7 % (ref 36–48)
HGB BLD-MCNC: 12.7 G/DL (ref 12–16)
LYMPHOCYTES # BLD: 1 K/UL (ref 1–5.1)
LYMPHOCYTES NFR BLD: 14.3 %
MCH RBC QN AUTO: 28.9 PG (ref 26–34)
MCHC RBC AUTO-ENTMCNC: 31.9 G/DL (ref 31–36)
MCV RBC AUTO: 90.4 FL (ref 80–100)
MONOCYTES # BLD: 0.6 K/UL (ref 0–1.3)
MONOCYTES NFR BLD: 9.1 %
NEUTROPHILS # BLD: 5.3 K/UL (ref 1.7–7.7)
NEUTROPHILS NFR BLD: 76.2 %
PLATELET # BLD AUTO: 163 K/UL (ref 135–450)
PMV BLD AUTO: 7.9 FL (ref 5–10.5)
POTASSIUM SERPL-SCNC: 4.4 MMOL/L (ref 3.5–5.1)
RBC # BLD AUTO: 4.39 M/UL (ref 4–5.2)
SODIUM SERPL-SCNC: 132 MMOL/L (ref 136–145)
WBC # BLD AUTO: 7 K/UL (ref 4–11)

## 2024-11-10 PROCEDURE — 1200000000 HC SEMI PRIVATE

## 2024-11-10 PROCEDURE — 36415 COLL VENOUS BLD VENIPUNCTURE: CPT

## 2024-11-10 PROCEDURE — 85025 COMPLETE CBC W/AUTO DIFF WBC: CPT

## 2024-11-10 PROCEDURE — 6360000002 HC RX W HCPCS: Performed by: INTERNAL MEDICINE

## 2024-11-10 PROCEDURE — 80048 BASIC METABOLIC PNL TOTAL CA: CPT

## 2024-11-10 PROCEDURE — 6370000000 HC RX 637 (ALT 250 FOR IP): Performed by: INTERNAL MEDICINE

## 2024-11-10 PROCEDURE — 2580000003 HC RX 258: Performed by: INTERNAL MEDICINE

## 2024-11-10 RX ORDER — HEPARIN SODIUM 5000 [USP'U]/ML
5000 INJECTION, SOLUTION INTRAVENOUS; SUBCUTANEOUS 2 TIMES DAILY
Status: DISCONTINUED | OUTPATIENT
Start: 2024-11-11 | End: 2024-11-14

## 2024-11-10 RX ADMIN — SODIUM CHLORIDE, PRESERVATIVE FREE 10 ML: 5 INJECTION INTRAVENOUS at 08:41

## 2024-11-10 RX ADMIN — ACETAMINOPHEN 650 MG: 325 TABLET ORAL at 00:08

## 2024-11-10 RX ADMIN — SODIUM CHLORIDE, PRESERVATIVE FREE 10 ML: 5 INJECTION INTRAVENOUS at 21:41

## 2024-11-10 RX ADMIN — OXYCODONE 2.5 MG: 5 TABLET ORAL at 08:40

## 2024-11-10 RX ADMIN — LEVOTHYROXINE SODIUM 137 MCG: 0.14 TABLET ORAL at 08:40

## 2024-11-10 RX ADMIN — ACETAMINOPHEN 650 MG: 325 TABLET ORAL at 21:30

## 2024-11-10 RX ADMIN — ENOXAPARIN SODIUM 30 MG: 100 INJECTION SUBCUTANEOUS at 08:39

## 2024-11-10 RX ADMIN — HYDROMORPHONE HYDROCHLORIDE 0.5 MG: 1 INJECTION, SOLUTION INTRAMUSCULAR; INTRAVENOUS; SUBCUTANEOUS at 21:30

## 2024-11-10 RX ADMIN — HYDROMORPHONE HYDROCHLORIDE 0.5 MG: 1 INJECTION, SOLUTION INTRAMUSCULAR; INTRAVENOUS; SUBCUTANEOUS at 04:54

## 2024-11-10 RX ADMIN — OXYCODONE 2.5 MG: 5 TABLET ORAL at 18:37

## 2024-11-10 RX ADMIN — MIRTAZAPINE 15 MG: 15 TABLET, FILM COATED ORAL at 21:30

## 2024-11-10 ASSESSMENT — PAIN SCALES - WONG BAKER
WONGBAKER_NUMERICALRESPONSE: HURTS WHOLE LOT
WONGBAKER_NUMERICALRESPONSE: NO HURT
WONGBAKER_NUMERICALRESPONSE: HURTS A LITTLE BIT
WONGBAKER_NUMERICALRESPONSE: HURTS WHOLE LOT
WONGBAKER_NUMERICALRESPONSE: NO HURT
WONGBAKER_NUMERICALRESPONSE: HURTS A LITTLE BIT
WONGBAKER_NUMERICALRESPONSE: HURTS LITTLE MORE
WONGBAKER_NUMERICALRESPONSE: NO HURT
WONGBAKER_NUMERICALRESPONSE: HURTS A LITTLE BIT

## 2024-11-10 ASSESSMENT — PAIN DESCRIPTION - FREQUENCY
FREQUENCY: CONTINUOUS

## 2024-11-10 ASSESSMENT — PAIN DESCRIPTION - LOCATION
LOCATION: HIP

## 2024-11-10 ASSESSMENT — PAIN DESCRIPTION - PAIN TYPE
TYPE: ACUTE PAIN

## 2024-11-10 ASSESSMENT — PAIN - FUNCTIONAL ASSESSMENT
PAIN_FUNCTIONAL_ASSESSMENT: ACTIVITIES ARE NOT PREVENTED

## 2024-11-10 ASSESSMENT — PAIN DESCRIPTION - ORIENTATION
ORIENTATION: LEFT

## 2024-11-10 ASSESSMENT — PAIN DESCRIPTION - ONSET
ONSET: ON-GOING

## 2024-11-10 ASSESSMENT — PAIN SCALES - GENERAL
PAINLEVEL_OUTOF10: 2
PAINLEVEL_OUTOF10: 3
PAINLEVEL_OUTOF10: 8
PAINLEVEL_OUTOF10: 5
PAINLEVEL_OUTOF10: 6
PAINLEVEL_OUTOF10: 8
PAINLEVEL_OUTOF10: 4
PAINLEVEL_OUTOF10: 2

## 2024-11-10 ASSESSMENT — PAIN DESCRIPTION - DESCRIPTORS
DESCRIPTORS: ACHING

## 2024-11-10 NOTE — PROGRESS NOTES
Pt awake in bed, alert and oriented to self, disoriented to time, place, and situation. VSS on 2 L NC with exceptions to asymptomatic tachycardia and HTN for which MD is aware, pt exhibiting no s/s acute distress at this time. Pt tolerating PO diet well. Pt refused ambulating this shift, pt in too much pain to ambulate. Pt tolerating Q2 turns fairly well. Pt voiding via pure wick. Pt pain managed via MAR. Standard safety precautions in place, bed locked and in lowest position, bed/chair alarm on, gripper socks applied, bedside table/call light within reach, pt oriented to fall prevention measures. Video monitoring utilized to promote safety. Pt states she Has no other needs at this time. Plan of care to continue.    Electronically signed by TABITHA MICHELLE RN on 11/10/2024 at 1:05 AM

## 2024-11-10 NOTE — PROGRESS NOTES
[facility] - Clinical Pharmacy Note - Renal Dosing    Enoxaparin ordered for treatment of DVT ppx. Per [facility] Renal Dose Adjustment Policy, LMWH 30 mg daily will be changed to UFH 5000 units BID.     Estimated Creatinine Clearance: Estimated Creatinine Clearance: 29 mL/min (based on SCr of 0.9 mg/dL).  Dialysis Status, KAYCEE, CKD: age related decline  BMI: Body mass index is 20.8 kg/m².    Rationale for Adjustment: Agent is renally eliminated.    Pharmacy will continue to monitor renal function and adjust dose as necessary.      Please call with any questions,  Marta Bergman, Yaritza  PGY1 Pharmacy Resident  Wireless: [phone]  11/10/2024 1:16 PM

## 2024-11-10 NOTE — PLAN OF CARE
Problem: Chronic Conditions and Co-morbidities  Goal: Patient's chronic conditions and co-morbidity symptoms are monitored and maintained or improved  Outcome: Progressing     Pt chronic conditions managed via MAR    Problem: Discharge Planning  Goal: Discharge to home or other facility with appropriate resources  Outcome: Progressing     Problem: Safety - Adult  Goal: Free from fall injury  11/9/2024 2338 by Damian Will RN  Outcome: Progressing     Standard safety precautions in place, bed locked and in lowest position, bed/chair alarm on, bedside table/call light within reach, gripper socks applied, pt oriented to fall prevention measures. Video monitoring utilized to promote safety.    Problem: Skin/Tissue Integrity  Goal: Absence of new skin breakdown  Description: 1.  Monitor for areas of redness and/or skin breakdown  2.  Assess vascular access sites hourly  3.  Every 4-6 hours minimum:  Change oxygen saturation probe site  4.  Every 4-6 hours:  If on nasal continuous positive airway pressure, respiratory therapy assess nares and determine need for appliance change or resting period.  Outcome: Progressing     Problem: ABCDS Injury Assessment  Goal: Absence of physical injury  Outcome: Progressing     Problem: Pain  Goal: Verbalizes/displays adequate comfort level or baseline comfort level  11/9/2024 2338 by Damian Will RN  Outcome: Progressing     Pt pain managed via MAR

## 2024-11-10 NOTE — PROGRESS NOTES
[facility] Medicine Progress Note  V 10.25      Date of Admission: 11/8/2024    Hospital Day: 3      Chief Admission Complaint:   fall, witnessed to be mechanical     Subjective:  Patient seen and examined at bedside. No acute events overnight. Patient is arousable, follows commands. Patient appeared to be in pain, though unable to elicit where her pain was or what was causing her to feel uncomfortable. Spoke to daughter today, she believes patient didn't trip and fall at her assisted living facility but rather that she will often refuse to use her walker and likely fell related to not using her walker.     Presenting Admission History:       Shanice Guillen is a 89 y.o. female with pmh as mentioned above presents from her nursing care facility after a witnessed fall earlier today.  History is limited and obtained from EMS primarily-patient has tripped and fallen which was witnessed by the nursing home staff.  Patient did not strike her head or lose consciousness.  Patient is not on any anticoagulation or antiplatelets.  Patient has reportedly complaining of left hip pain prior to transfer to ED.  Patient denies any pain currently and feels well.  Denies lightheadedness or dizziness prior to the fall    Assessment/Plan:      Current Principal Problem:  Closed avulsion fracture of anterior superior iliac spine of pelvis (HCC)    Plan:    Witnessed mechanical fall  Left hip pain  CT left hip with acute nondisplaced fracture of the anterior superior iliac spine  - Pain relief as ordered  - Orthopedic surgery consult placed 11/8, has not seen yet. Unclear if the initial consult was called in on that date. Consult resent early this morning to orthopedic surgery  - PT/OT after seen by orthopedic surgery  - Social service and case management consults. From assisted living at the [facility]    Hypoxia  Elevated D-dimer  - CTPE done, showed no acute PE. Does show extensive opacification bronchus intermedius and bilateral lower

## 2024-11-11 ENCOUNTER — APPOINTMENT (OUTPATIENT)
Dept: GENERAL RADIOLOGY | Age: 89
DRG: 542 | End: 2024-11-11
Payer: MEDICARE

## 2024-11-11 LAB
ALBUMIN SERPL-MCNC: 3.3 G/DL (ref 3.4–5)
ALBUMIN/GLOB SERPL: 1.1 {RATIO} (ref 1.1–2.2)
ALP SERPL-CCNC: 80 U/L (ref 40–129)
ALT SERPL-CCNC: 10 U/L (ref 10–40)
AMORPH SED URNS QL MICRO: ABNORMAL /HPF
ANION GAP SERPL CALCULATED.3IONS-SCNC: 11 MMOL/L (ref 3–16)
ANION GAP SERPL CALCULATED.3IONS-SCNC: 11 MMOL/L (ref 3–16)
AST SERPL-CCNC: 20 U/L (ref 15–37)
BACTERIA URNS QL MICRO: ABNORMAL /HPF
BASOPHILS # BLD: 0 K/UL (ref 0–0.2)
BASOPHILS # BLD: 0 K/UL (ref 0–0.2)
BASOPHILS NFR BLD: 0 %
BASOPHILS NFR BLD: 0.3 %
BILIRUB SERPL-MCNC: 0.9 MG/DL (ref 0–1)
BILIRUB UR QL STRIP.AUTO: NEGATIVE
BUN SERPL-MCNC: 28 MG/DL (ref 7–20)
BUN SERPL-MCNC: 31 MG/DL (ref 7–20)
CALCIUM SERPL-MCNC: 8.4 MG/DL (ref 8.3–10.6)
CALCIUM SERPL-MCNC: 9 MG/DL (ref 8.3–10.6)
CHLORIDE SERPL-SCNC: 101 MMOL/L (ref 99–110)
CHLORIDE SERPL-SCNC: 99 MMOL/L (ref 99–110)
CLARITY UR: CLEAR
CO2 SERPL-SCNC: 21 MMOL/L (ref 21–32)
CO2 SERPL-SCNC: 24 MMOL/L (ref 21–32)
COLOR UR: YELLOW
CREAT SERPL-MCNC: 1.2 MG/DL (ref 0.6–1.2)
CREAT SERPL-MCNC: 1.3 MG/DL (ref 0.6–1.2)
CRYSTALS URNS MICRO: ABNORMAL /HPF
DEPRECATED RDW RBC AUTO: 14.6 % (ref 12.4–15.4)
DEPRECATED RDW RBC AUTO: 14.7 % (ref 12.4–15.4)
EKG ATRIAL RATE: 108 BPM
EKG DIAGNOSIS: NORMAL
EKG P AXIS: 73 DEGREES
EKG P-R INTERVAL: 138 MS
EKG Q-T INTERVAL: 334 MS
EKG QRS DURATION: 72 MS
EKG QTC CALCULATION (BAZETT): 447 MS
EKG R AXIS: 47 DEGREES
EKG T AXIS: 70 DEGREES
EKG VENTRICULAR RATE: 108 BPM
EOSINOPHIL # BLD: 0 K/UL (ref 0–0.6)
EOSINOPHIL # BLD: 0 K/UL (ref 0–0.6)
EOSINOPHIL NFR BLD: 0 %
EOSINOPHIL NFR BLD: 0 %
EPI CELLS #/AREA URNS HPF: ABNORMAL /HPF (ref 0–5)
GFR SERPLBLD CREATININE-BSD FMLA CKD-EPI: 38 ML/MIN/{1.73_M2}
GFR SERPLBLD CREATININE-BSD FMLA CKD-EPI: 42 ML/MIN/{1.73_M2}
GLUCOSE SERPL-MCNC: 116 MG/DL (ref 70–99)
GLUCOSE SERPL-MCNC: 133 MG/DL (ref 70–99)
GLUCOSE UR STRIP.AUTO-MCNC: NEGATIVE MG/DL
HCT VFR BLD AUTO: 33.8 % (ref 36–48)
HCT VFR BLD AUTO: 35.5 % (ref 36–48)
HGB BLD-MCNC: 11.1 G/DL (ref 12–16)
HGB BLD-MCNC: 11.4 G/DL (ref 12–16)
HGB UR QL STRIP.AUTO: NEGATIVE
KETONES UR STRIP.AUTO-MCNC: 15 MG/DL
LACTATE BLDV-SCNC: 1.5 MMOL/L (ref 0.4–2)
LEUKOCYTE ESTERASE UR QL STRIP.AUTO: NEGATIVE
LYMPHOCYTES # BLD: 0.8 K/UL (ref 1–5.1)
LYMPHOCYTES # BLD: 1 K/UL (ref 1–5.1)
LYMPHOCYTES NFR BLD: 10.5 %
LYMPHOCYTES NFR BLD: 12 %
MAGNESIUM SERPL-MCNC: 1.87 MG/DL (ref 1.8–2.4)
MCH RBC QN AUTO: 28.7 PG (ref 26–34)
MCH RBC QN AUTO: 29.2 PG (ref 26–34)
MCHC RBC AUTO-ENTMCNC: 32.2 G/DL (ref 31–36)
MCHC RBC AUTO-ENTMCNC: 32.9 G/DL (ref 31–36)
MCV RBC AUTO: 88.8 FL (ref 80–100)
MCV RBC AUTO: 88.9 FL (ref 80–100)
MONOCYTES # BLD: 0.5 K/UL (ref 0–1.3)
MONOCYTES # BLD: 0.7 K/UL (ref 0–1.3)
MONOCYTES NFR BLD: 6 %
MONOCYTES NFR BLD: 8.9 %
NEUTROPHILS # BLD: 5.9 K/UL (ref 1.7–7.7)
NEUTROPHILS # BLD: 6.8 K/UL (ref 1.7–7.7)
NEUTROPHILS NFR BLD: 80.3 %
NEUTROPHILS NFR BLD: 82 %
NITRITE UR QL STRIP.AUTO: POSITIVE
PH UR STRIP.AUTO: 6 [PH] (ref 5–8)
PLATELET # BLD AUTO: 172 K/UL (ref 135–450)
PLATELET # BLD AUTO: 287 K/UL (ref 135–450)
PMV BLD AUTO: 7.8 FL (ref 5–10.5)
PMV BLD AUTO: 8.3 FL (ref 5–10.5)
POTASSIUM SERPL-SCNC: 4.1 MMOL/L (ref 3.5–5.1)
POTASSIUM SERPL-SCNC: 4.2 MMOL/L (ref 3.5–5.1)
PROCALCITONIN SERPL IA-MCNC: 9.69 NG/ML (ref 0–0.15)
PROT SERPL-MCNC: 6.2 G/DL (ref 6.4–8.2)
PROT UR STRIP.AUTO-MCNC: 30 MG/DL
RBC # BLD AUTO: 3.8 M/UL (ref 4–5.2)
RBC # BLD AUTO: 3.99 M/UL (ref 4–5.2)
RBC #/AREA URNS HPF: ABNORMAL /HPF (ref 0–4)
RBC MORPH BLD: NORMAL
SODIUM SERPL-SCNC: 133 MMOL/L (ref 136–145)
SODIUM SERPL-SCNC: 134 MMOL/L (ref 136–145)
SP GR UR STRIP.AUTO: >=1.03 (ref 1–1.03)
UA COMPLETE W REFLEX CULTURE PNL UR: ABNORMAL
UA DIPSTICK W REFLEX MICRO PNL UR: YES
URN SPEC COLLECT METH UR: ABNORMAL
UROBILINOGEN UR STRIP-ACNC: 0.2 E.U./DL
WBC # BLD AUTO: 7.3 K/UL (ref 4–11)
WBC # BLD AUTO: 8.3 K/UL (ref 4–11)
WBC #/AREA URNS HPF: ABNORMAL /HPF (ref 0–5)

## 2024-11-11 PROCEDURE — 1200000000 HC SEMI PRIVATE

## 2024-11-11 PROCEDURE — 85025 COMPLETE CBC W/AUTO DIFF WBC: CPT

## 2024-11-11 PROCEDURE — 2580000003 HC RX 258: Performed by: NURSE PRACTITIONER

## 2024-11-11 PROCEDURE — 6360000002 HC RX W HCPCS

## 2024-11-11 PROCEDURE — 80053 COMPREHEN METABOLIC PANEL: CPT

## 2024-11-11 PROCEDURE — 36415 COLL VENOUS BLD VENIPUNCTURE: CPT

## 2024-11-11 PROCEDURE — 6370000000 HC RX 637 (ALT 250 FOR IP)

## 2024-11-11 PROCEDURE — 83735 ASSAY OF MAGNESIUM: CPT

## 2024-11-11 PROCEDURE — 6370000000 HC RX 637 (ALT 250 FOR IP): Performed by: INTERNAL MEDICINE

## 2024-11-11 PROCEDURE — 2580000003 HC RX 258: Performed by: INTERNAL MEDICINE

## 2024-11-11 PROCEDURE — 2580000003 HC RX 258

## 2024-11-11 PROCEDURE — 94761 N-INVAS EAR/PLS OXIMETRY MLT: CPT

## 2024-11-11 PROCEDURE — 71045 X-RAY EXAM CHEST 1 VIEW: CPT

## 2024-11-11 PROCEDURE — 84145 PROCALCITONIN (PCT): CPT

## 2024-11-11 PROCEDURE — 83605 ASSAY OF LACTIC ACID: CPT

## 2024-11-11 PROCEDURE — 93005 ELECTROCARDIOGRAM TRACING: CPT

## 2024-11-11 PROCEDURE — 2700000000 HC OXYGEN THERAPY PER DAY

## 2024-11-11 PROCEDURE — 81001 URINALYSIS AUTO W/SCOPE: CPT

## 2024-11-11 PROCEDURE — 93010 ELECTROCARDIOGRAM REPORT: CPT | Performed by: INTERNAL MEDICINE

## 2024-11-11 PROCEDURE — 87040 BLOOD CULTURE FOR BACTERIA: CPT

## 2024-11-11 RX ORDER — 0.9 % SODIUM CHLORIDE 0.9 %
500 INTRAVENOUS SOLUTION INTRAVENOUS ONCE
Status: COMPLETED | OUTPATIENT
Start: 2024-11-11 | End: 2024-11-11

## 2024-11-11 RX ORDER — METOPROLOL TARTRATE 25 MG/1
12.5 TABLET, FILM COATED ORAL 2 TIMES DAILY
Status: DISCONTINUED | OUTPATIENT
Start: 2024-11-11 | End: 2024-11-18 | Stop reason: HOSPADM

## 2024-11-11 RX ORDER — SODIUM CHLORIDE 9 MG/ML
INJECTION, SOLUTION INTRAVENOUS CONTINUOUS
Status: ACTIVE | OUTPATIENT
Start: 2024-11-11 | End: 2024-11-11

## 2024-11-11 RX ORDER — OXYCODONE HYDROCHLORIDE 5 MG/1
2.5 TABLET ORAL EVERY 4 HOURS PRN
Status: DISCONTINUED | OUTPATIENT
Start: 2024-11-11 | End: 2024-11-18 | Stop reason: HOSPADM

## 2024-11-11 RX ORDER — AZITHROMYCIN 250 MG/1
500 TABLET, FILM COATED ORAL DAILY
Status: COMPLETED | OUTPATIENT
Start: 2024-11-11 | End: 2024-11-13

## 2024-11-11 RX ADMIN — SODIUM CHLORIDE: 9 INJECTION, SOLUTION INTRAVENOUS at 08:56

## 2024-11-11 RX ADMIN — SODIUM CHLORIDE, PRESERVATIVE FREE 10 ML: 5 INJECTION INTRAVENOUS at 20:31

## 2024-11-11 RX ADMIN — OXYCODONE 2.5 MG: 5 TABLET ORAL at 11:49

## 2024-11-11 RX ADMIN — SODIUM CHLORIDE, PRESERVATIVE FREE 10 ML: 5 INJECTION INTRAVENOUS at 08:28

## 2024-11-11 RX ADMIN — METOPROLOL TARTRATE 12.5 MG: 25 TABLET, FILM COATED ORAL at 20:26

## 2024-11-11 RX ADMIN — MIRTAZAPINE 15 MG: 15 TABLET, FILM COATED ORAL at 20:27

## 2024-11-11 RX ADMIN — LEVOTHYROXINE SODIUM 137 MCG: 0.14 TABLET ORAL at 05:11

## 2024-11-11 RX ADMIN — SODIUM CHLORIDE 500 ML: 9 INJECTION, SOLUTION INTRAVENOUS at 00:52

## 2024-11-11 RX ADMIN — CEFTRIAXONE 1000 MG: 1 INJECTION, POWDER, FOR SOLUTION INTRAMUSCULAR; INTRAVENOUS at 08:56

## 2024-11-11 RX ADMIN — HEPARIN SODIUM 5000 UNITS: 5000 INJECTION INTRAVENOUS; SUBCUTANEOUS at 20:26

## 2024-11-11 RX ADMIN — AZITHROMYCIN DIHYDRATE 500 MG: 250 TABLET, FILM COATED ORAL at 08:55

## 2024-11-11 RX ADMIN — HEPARIN SODIUM 5000 UNITS: 5000 INJECTION INTRAVENOUS; SUBCUTANEOUS at 08:28

## 2024-11-11 ASSESSMENT — PAIN DESCRIPTION - FREQUENCY: FREQUENCY: CONTINUOUS

## 2024-11-11 ASSESSMENT — PAIN DESCRIPTION - PAIN TYPE: TYPE: ACUTE PAIN

## 2024-11-11 ASSESSMENT — PAIN SCALES - WONG BAKER: WONGBAKER_NUMERICALRESPONSE: HURTS LITTLE MORE

## 2024-11-11 ASSESSMENT — PAIN - FUNCTIONAL ASSESSMENT: PAIN_FUNCTIONAL_ASSESSMENT: PREVENTS OR INTERFERES WITH MANY ACTIVE NOT PASSIVE ACTIVITIES

## 2024-11-11 ASSESSMENT — PAIN SCALES - GENERAL: PAINLEVEL_OUTOF10: 1

## 2024-11-11 ASSESSMENT — PAIN DESCRIPTION - ONSET: ONSET: ON-GOING

## 2024-11-11 ASSESSMENT — PAIN DESCRIPTION - ORIENTATION: ORIENTATION: LEFT

## 2024-11-11 ASSESSMENT — PAIN DESCRIPTION - DESCRIPTORS: DESCRIPTORS: ACHING

## 2024-11-11 ASSESSMENT — PAIN DESCRIPTION - LOCATION: LOCATION: HIP

## 2024-11-11 NOTE — PROGRESS NOTES
Pt in bed eyes closed, respirations even and unlabored, alert to stimulus and oriented to person. VSS on 2L NC with exception to fever and sinus tachy. MD aware, no new orders at this time. Pt exhibiting no s/s acute distress at this time. Pt tolerating PO diet well. Pt voiding via external catheter. Pt pain managed via MAR. Standard safety precautions in place, bed locked and in lowest position, bed/chair alarm on, gripper socks applied, bedside table/call light within reach, pt oriented to fall prevention measures. Video monitoring utilized to promote pt safety. Pt states she Has no other needs at this time. Plan of care to continue.    Electronically signed by TABITHA MICHELLE RN on 11/11/2024 at 12:07 AM

## 2024-11-11 NOTE — CONSULTS
Wound Referral Progress Note       NAME:  Shanice Guillen  MEDICAL RECORD NUMBER:  [id number]  AGE: 89 y.o.   GENDER: female  : 1932  TODAY'S DATE:  2024    Subjective   Reason for WOCN Evaluation and Assessment: Coccyx - Stage 2      Shanice Guillen is a 89 y.o. female referred by:   Nursing    Wound Identification:  Wound Type: pressure  Contributing Factors: diabetes, chronic pressure, and incontinence of urine    Wound History: Shanice Guillen is a 89 y.o. female with pmh as mentioned above presents from her nursing care facility after a witnessed fall earlier today.  History is limited and obtained from EMS primarily-patient has tripped and fallen which was witnessed by the nursing home staff.  Patient did not strike her head or lose consciousness.  Patient is not on any anticoagulation or antiplatelets.  Patient has reportedly complaining of left hip pain prior to transfer to ED.  Patient denies any pain currently and feels well.  Denies lightheadedness or dizziness prior to the fall  Current Wound Care Treatment:  Coccyx - silicone foam    Patient Care Goal:  Wound Healing        PAST MEDICAL HISTORY        Diagnosis Date    CAD (coronary artery disease)     Diabetes mellitus (HCC)     Generalized weakness     Hyperlipidemia     Memory loss     Unspecified cerebral artery occlusion with cerebral infarction        PAST SURGICAL HISTORY    Past Surgical History:   Procedure Laterality Date    APPENDECTOMY      CARDIAC SURGERY      HERNIA REPAIR      HYSTERECTOMY (CERVIX STATUS UNKNOWN)      JOINT REPLACEMENT         FAMILY HISTORY    No family history on file.    SOCIAL HISTORY    Social History     Tobacco Use    Smoking status: Every Day     Current packs/day: 0.25     Types: Cigarettes   Substance Use Topics    Alcohol use: No     Alcohol/week: 0.0 standard drinks of alcohol    Drug use: No       ALLERGIES    Allergies   Allergen Reactions    Codeine Other (See Comments)     Unknown reaction    Iv

## 2024-11-11 NOTE — PLAN OF CARE
Problem: Chronic Conditions and Co-morbidities  Goal: Patient's chronic conditions and co-morbidity symptoms are monitored and maintained or improved  Outcome: Progressing     Pt chronic conditions managed via MAR    Problem: Discharge Planning  Goal: Discharge to home or other facility with appropriate resources  Outcome: Progressing     Problem: Safety - Adult  Goal: Free from fall injury  11/10/2024 2357 by Damian Will RN  Outcome: Progressing     Standard safety precautions in place, bed locked and in lowest position, bed/chair alarm on, bedside table/call light within reach, gripper socks applied, pt oriented to fall prevention measures. Video monitoring utilized to promote safety. Pt reoriented to fall prevention measures as appropriate.    Problem: Skin/Tissue Integrity  Goal: Absence of new skin breakdown  Description: 1.  Monitor for areas of redness and/or skin breakdown  2.  Assess vascular access sites hourly  3.  Every 4-6 hours minimum:  Change oxygen saturation probe site  4.  Every 4-6 hours:  If on nasal continuous positive airway pressure, respiratory therapy assess nares and determine need for appliance change or resting period.  Outcome: Progressing     Problem: ABCDS Injury Assessment  Goal: Absence of physical injury  Outcome: Progressing     Problem: Pain  Goal: Verbalizes/displays adequate comfort level or baseline comfort level  11/10/2024 2357 by Damian Will RN  Outcome: Progressing

## 2024-11-11 NOTE — PROGRESS NOTES
Physical Therapy/Occupational therapy  Hold    Orders received, chart reviewed.  Pt with (+) fx of L ASIS.  Ortho consult pending.  Will hold therapy evals until ortho sees pt.  Will f/u later this date vs 11/12.    Monae Buenrostro, PT, DPT  [id number]  MCKENZIE Garcia, OTR/L [id number]

## 2024-11-11 NOTE — SIGNIFICANT EVENT
AP notified by RN of patient with hypertension.  Blood pressure 84/48.  Repeat 72/42.  MAP is in the 50s.  Patient also with hypoxia, noted febrile to Tmax 22.8 around 7 PM this evening.  -Tylenol effective at reducing fever however, patient required Dilaudid for pain control which is likely contributing to her hypotension  -Given new onset fever with known atelectasis and now sepsis parameters, will get infectious workup including chest x-ray.  -Lactic acid, CBC, CMP, blood cultures x 2  -Urinalysis  -Normal saline bolus for hypotension  -Hold narcotics  -Blood pressure per unit routine  -Further pending results of above    Rose Mary Pisano, PALMA - NP

## 2024-11-11 NOTE — PLAN OF CARE
Problem: Discharge Planning  Goal: Discharge to home or other facility with appropriate resources  11/11/2024 1242 by Sidra Montemayor RN  Outcome: Progressing   Pt involved in discharge planning. Barriers to discharge discussed with patient. Discharge learning needs identified. Discuss with patient any additional needed resources and transportation plans. Case management following plan of care.      Problem: Safety - Adult  Goal: Free from fall injury  11/11/2024 1242 by Sidra Montemayor RN  Outcome: Progressing  All fall precautions in place. Bed locked and in lowest position with alarm on. Overbed table and personal belonings within reach. Call light within reach and patient instructed to use call light for assistance. Non-skid socks on.      Problem: Skin/Tissue Integrity  Goal: Absence of new skin breakdown  Description: 1.  Monitor for areas of redness and/or skin breakdown  2.  Assess vascular access sites hourly  3.  Every 4-6 hours minimum:  Change oxygen saturation probe site  4.  Every 4-6 hours:  If on nasal continuous positive airway pressure, respiratory therapy assess nares and determine need for appliance change or resting period.  11/11/2024 1242 by Sidra Montemayor, RN  Outcome: Progressing  Q2 turns to prevent skin breakdown. No evidence of new skin breakdown noted.

## 2024-11-11 NOTE — PROGRESS NOTES
[facility] Medicine Progress Note  V 10.25      Date of Admission: 11/8/2024    Hospital Day: 4      Chief Admission Complaint:   fall, witnessed to be mechanical     Subjective:  Patient seen and examined at bedside. Patient febrile overnight, blood pressures low. Received IV fluid bolus, infectious workup ordered. Patient much more alert, responsive today. Reports that she feels okay right now, not SOB, denies fevers, chills, cough. She also denies pain.     Presenting Admission History:       Shanice Guillen is a 89 y.o. female with pmh as mentioned above presents from her nursing care facility after a witnessed fall earlier today.  History is limited and obtained from EMS primarily-patient has tripped and fallen which was witnessed by the nursing home staff.  Patient did not strike her head or lose consciousness.  Patient is not on any anticoagulation or antiplatelets.  Patient has reportedly complaining of left hip pain prior to transfer to ED.  Patient denies any pain currently and feels well.  Denies lightheadedness or dizziness prior to the fall    Assessment/Plan:      Current Principal Problem:  Closed avulsion fracture of anterior superior iliac spine of pelvis (HCC)    Plan:    Witnessed mechanical fall  Left hip pain  CT left hip with acute nondisplaced fracture of the anterior superior iliac spine  - Pain relief as ordered  - Orthopedic surgery consult placed 11/8, has not seen yet. Unclear if the initial consult was called in on that date. Consult resent early this morning to orthopedic surgery  - PT/OT after seen by orthopedic surgery  - Social service and case management consults. From assisted living at the [facility]  - Discussed with orthopedics today, they will come by and put formal consult in though they had discussed with ED on admission and recommended no intervention at that time    Hypoxia  Elevated D-dimer  CAP, POA  - CTPE done, showed no acute PE. Does show extensive opacification bronchus

## 2024-11-11 NOTE — CARE COORDINATION
Case Management Assessment  Initial Evaluation    Date/Time of Evaluation: 11/11/2024 4:16 PM  Assessment Completed by: SANTI Benedict    If patient is discharged prior to next notation, then this note serves as note for discharge by case management.    Patient Name: Shanice Guillen                   YOB: 1932  Diagnosis: Hypoxemia [R09.02]  Closed avulsion fracture of anterior superior iliac spine of pelvis ([facility]) [S32.313A]  Closed fracture of left anterior superior iliac spine, initial encounter ([facility]) [S32.392A]                   Date / Time: 11/8/2024 11:46 AM    Patient Admission Status: Inpatient   Readmission Risk (Low < 19, Mod (19-27), High > 27): Readmission Risk Score: 13.7    Current PCP: Analia Dee MD  PCP verified by CM? Yes    Chart Reviewed: Yes      History Provided by: Child/Family, Medical Record  Patient Orientation: Person    Patient Cognition: Dementia / Early Alzheimer's    Hospitalization in the last 30 days (Readmission):  No    If yes, Readmission Assessment in  Navigator will be completed.    Advance Directives:      Code Status: Limited   Patient's Primary Decision Maker is: Legal Next of Kin      Discharge Planning:    Patient lives with: Alone Type of Home: Assisted living ([facility])  Primary Care Giver: Other (Comment) (AL at [facility])  Patient Support Systems include: Family Members   Current Financial resources: Medicare, Other (Comment) (medicare supplementa)  Current community resources: Assisted Living ([facility])  Current services prior to admission: Other (Comment) (Assisted Living at [facility])            Current DME:              Type of Home Care services:  None    ADLS  Prior functional level: Bathing, Dressing, Cooking, Housework, Shopping, Mobility  Current functional level: Other (see comment) (PT/OT pending)    PT AM-PAC:   /24  OT AM-PAC:   /24    Family can provide assistance at DC: No  Would you like Case Management to discuss the  [S32.313A]  Closed fracture of left anterior superior iliac spine, initial encounter ([facility]) [S37.392A]    IF APPLICABLE: The Patient and/or patient representative Shanice and her family were provided with a choice of provider and agrees with the discharge plan. Freedom of choice list with basic dialogue that supports the patient's individualized plan of care/goals and shares the quality data associated with the providers was provided to: Patient Representative   Patient Representative Name: Daughter - Ketty     The Patient and/or Patient Representative Agree with the Discharge Plan? Yes    SANTI Benedict  Case Management Department  Ph: [phone] Fax: [phone]

## 2024-11-11 NOTE — PLAN OF CARE
Problem: Discharge Planning  Goal: Discharge to home or other facility with appropriate resources  11/11/2024 1239 by Sidra Montemayor RN  Outcome: Progressing  Flowsheets (Taken 11/11/2024 0800)  Discharge to home or other facility with appropriate resources: Identify barriers to discharge with patient and caregiver   Pt involved in discharge planning. Barriers to discharge discussed with patient. Discharge learning needs identified. Discuss with patient any additional needed resources and transportation plans. Case management following plan of care.    Problem: ABCDS Injury Assessment  Goal: Absence of physical injury  11/11/2024 1239 by Sidra Montemayor, RN  Outcome: Progressing   All fall precautions in place. Bed locked and in lowest position with alarm on. Overbed table and personal belonings within reach. Call light within reach and patient instructed to use call light for assistance. Non-skid socks on.    Problem: Safety - Adult  Goal: Free from fall injury  11/11/2024 1240 by Sidra Montemayor, RN  Outcome: Progressing  All fall precautions in place. Bed locked and in lowest position with alarm on. Overbed table and personal belonings within reach. Call light within reach and patient instructed to use call light for assistance. Non-skid socks on.

## 2024-11-12 LAB
ANION GAP SERPL CALCULATED.3IONS-SCNC: 10 MMOL/L (ref 3–16)
BASOPHILS # BLD: 0 K/UL (ref 0–0.2)
BASOPHILS NFR BLD: 0.1 %
BUN SERPL-MCNC: 26 MG/DL (ref 7–20)
CALCIUM SERPL-MCNC: 8.7 MG/DL (ref 8.3–10.6)
CHLORIDE SERPL-SCNC: 105 MMOL/L (ref 99–110)
CO2 SERPL-SCNC: 23 MMOL/L (ref 21–32)
CREAT SERPL-MCNC: 0.8 MG/DL (ref 0.6–1.2)
DEPRECATED RDW RBC AUTO: 14.7 % (ref 12.4–15.4)
EOSINOPHIL # BLD: 0 K/UL (ref 0–0.6)
EOSINOPHIL NFR BLD: 0.6 %
GFR SERPLBLD CREATININE-BSD FMLA CKD-EPI: 69 ML/MIN/{1.73_M2}
GLUCOSE SERPL-MCNC: 88 MG/DL (ref 70–99)
HCT VFR BLD AUTO: 32.2 % (ref 36–48)
HGB BLD-MCNC: 10.6 G/DL (ref 12–16)
LYMPHOCYTES # BLD: 0.9 K/UL (ref 1–5.1)
LYMPHOCYTES NFR BLD: 14.4 %
MCH RBC QN AUTO: 29.4 PG (ref 26–34)
MCHC RBC AUTO-ENTMCNC: 32.9 G/DL (ref 31–36)
MCV RBC AUTO: 89.4 FL (ref 80–100)
MONOCYTES # BLD: 0.6 K/UL (ref 0–1.3)
MONOCYTES NFR BLD: 10.6 %
NEUTROPHILS # BLD: 4.4 K/UL (ref 1.7–7.7)
NEUTROPHILS NFR BLD: 74.3 %
PLATELET # BLD AUTO: 200 K/UL (ref 135–450)
PMV BLD AUTO: 8 FL (ref 5–10.5)
POTASSIUM SERPL-SCNC: 3.7 MMOL/L (ref 3.5–5.1)
RBC # BLD AUTO: 3.6 M/UL (ref 4–5.2)
SODIUM SERPL-SCNC: 138 MMOL/L (ref 136–145)
WBC # BLD AUTO: 5.9 K/UL (ref 4–11)

## 2024-11-12 PROCEDURE — 92610 EVALUATE SWALLOWING FUNCTION: CPT

## 2024-11-12 PROCEDURE — 2580000003 HC RX 258

## 2024-11-12 PROCEDURE — 6370000000 HC RX 637 (ALT 250 FOR IP): Performed by: INTERNAL MEDICINE

## 2024-11-12 PROCEDURE — 97162 PT EVAL MOD COMPLEX 30 MIN: CPT

## 2024-11-12 PROCEDURE — 97530 THERAPEUTIC ACTIVITIES: CPT

## 2024-11-12 PROCEDURE — 85025 COMPLETE CBC W/AUTO DIFF WBC: CPT

## 2024-11-12 PROCEDURE — 2580000003 HC RX 258: Performed by: INTERNAL MEDICINE

## 2024-11-12 PROCEDURE — 6360000002 HC RX W HCPCS

## 2024-11-12 PROCEDURE — 1200000000 HC SEMI PRIVATE

## 2024-11-12 PROCEDURE — 80048 BASIC METABOLIC PNL TOTAL CA: CPT

## 2024-11-12 PROCEDURE — 36415 COLL VENOUS BLD VENIPUNCTURE: CPT

## 2024-11-12 PROCEDURE — 31720 CLEARANCE OF AIRWAYS: CPT

## 2024-11-12 PROCEDURE — 6370000000 HC RX 637 (ALT 250 FOR IP)

## 2024-11-12 PROCEDURE — 97166 OT EVAL MOD COMPLEX 45 MIN: CPT

## 2024-11-12 RX ORDER — POTASSIUM CHLORIDE 7.45 MG/ML
10 INJECTION INTRAVENOUS PRN
Status: DISCONTINUED | OUTPATIENT
Start: 2024-11-12 | End: 2024-11-18 | Stop reason: HOSPADM

## 2024-11-12 RX ORDER — IPRATROPIUM BROMIDE AND ALBUTEROL SULFATE 2.5; .5 MG/3ML; MG/3ML
1 SOLUTION RESPIRATORY (INHALATION)
Status: DISCONTINUED | OUTPATIENT
Start: 2024-11-12 | End: 2024-11-15

## 2024-11-12 RX ORDER — IPRATROPIUM BROMIDE AND ALBUTEROL SULFATE 2.5; .5 MG/3ML; MG/3ML
1 SOLUTION RESPIRATORY (INHALATION)
Status: DISCONTINUED | OUTPATIENT
Start: 2024-11-12 | End: 2024-11-12

## 2024-11-12 RX ORDER — GUAIFENESIN 600 MG/1
600 TABLET, EXTENDED RELEASE ORAL 2 TIMES DAILY
Status: DISCONTINUED | OUTPATIENT
Start: 2024-11-12 | End: 2024-11-13

## 2024-11-12 RX ORDER — POTASSIUM CHLORIDE 1500 MG/1
40 TABLET, EXTENDED RELEASE ORAL PRN
Status: DISCONTINUED | OUTPATIENT
Start: 2024-11-12 | End: 2024-11-18 | Stop reason: HOSPADM

## 2024-11-12 RX ADMIN — GUAIFENESIN 600 MG: 600 TABLET ORAL at 20:03

## 2024-11-12 RX ADMIN — SODIUM CHLORIDE, PRESERVATIVE FREE 10 ML: 5 INJECTION INTRAVENOUS at 12:21

## 2024-11-12 RX ADMIN — AZITHROMYCIN DIHYDRATE 500 MG: 250 TABLET, FILM COATED ORAL at 09:14

## 2024-11-12 RX ADMIN — METOPROLOL TARTRATE 12.5 MG: 25 TABLET, FILM COATED ORAL at 09:16

## 2024-11-12 RX ADMIN — LISINOPRIL 10 MG: 10 TABLET ORAL at 09:16

## 2024-11-12 RX ADMIN — METOPROLOL TARTRATE 12.5 MG: 25 TABLET, FILM COATED ORAL at 20:03

## 2024-11-12 RX ADMIN — LEVOTHYROXINE SODIUM 137 MCG: 0.14 TABLET ORAL at 05:43

## 2024-11-12 RX ADMIN — MIRTAZAPINE 15 MG: 15 TABLET, FILM COATED ORAL at 20:03

## 2024-11-12 RX ADMIN — GUAIFENESIN 600 MG: 600 TABLET ORAL at 12:16

## 2024-11-12 RX ADMIN — CEFTRIAXONE 1000 MG: 1 INJECTION, POWDER, FOR SOLUTION INTRAMUSCULAR; INTRAVENOUS at 09:16

## 2024-11-12 RX ADMIN — HEPARIN SODIUM 5000 UNITS: 5000 INJECTION INTRAVENOUS; SUBCUTANEOUS at 20:03

## 2024-11-12 RX ADMIN — HEPARIN SODIUM 5000 UNITS: 5000 INJECTION INTRAVENOUS; SUBCUTANEOUS at 09:14

## 2024-11-12 RX ADMIN — OXYCODONE 2.5 MG: 5 TABLET ORAL at 20:03

## 2024-11-12 ASSESSMENT — PAIN DESCRIPTION - ORIENTATION: ORIENTATION: LEFT

## 2024-11-12 ASSESSMENT — PAIN SCALES - GENERAL
PAINLEVEL_OUTOF10: 2
PAINLEVEL_OUTOF10: 6

## 2024-11-12 ASSESSMENT — PAIN DESCRIPTION - PAIN TYPE: TYPE: ACUTE PAIN

## 2024-11-12 ASSESSMENT — PAIN DESCRIPTION - ONSET: ONSET: ON-GOING

## 2024-11-12 ASSESSMENT — PAIN - FUNCTIONAL ASSESSMENT: PAIN_FUNCTIONAL_ASSESSMENT: ACTIVITIES ARE NOT PREVENTED

## 2024-11-12 ASSESSMENT — PAIN DESCRIPTION - LOCATION: LOCATION: HIP

## 2024-11-12 ASSESSMENT — PAIN DESCRIPTION - FREQUENCY: FREQUENCY: CONTINUOUS

## 2024-11-12 ASSESSMENT — PAIN DESCRIPTION - DESCRIPTORS: DESCRIPTORS: ACHING

## 2024-11-12 NOTE — RT PROTOCOL NOTE
RT Inhaler-Nebulizer Bronchodilator Protocol Note    There is a bronchodilator order in the chart from a provider indicating to follow the RT Bronchodilator Protocol and there is an “Initiate RT Inhaler-Nebulizer Bronchodilator Protocol” order as well (see protocol at bottom of note).    CXR Findings:  XR CHEST PORTABLE    Result Date: 11/11/2024  1.  Bibasilar atelectasis similar to recent CT. Electronically signed by Mj Rinaldi MD      The findings from the last RT Protocol Assessment were as follows:   History Pulmonary Disease: None or smoker <15 pack years  Respiratory Pattern: Regular pattern and RR 12-20 bpm  Breath Sounds: Slightly diminished and/or crackles  Cough: Weak, non-productive  Indication for Bronchodilator Therapy:    Bronchodilator Assessment Score: 5    Aerosolized bronchodilator medication orders have been revised according to the RT Inhaler-Nebulizer Bronchodilator Protocol below.    Respiratory Therapist to perform RT Therapy Protocol Assessment initially then follow the protocol.  Repeat RT Therapy Protocol Assessment PRN for score 0-3 or on second treatment, BID, and PRN for scores above 3.    No Indications - adjust the frequency to every 6 hours PRN wheezing or bronchospasm, if no treatments needed after 48 hours then discontinue using Per Protocol order mode.     If indication present, adjust the RT bronchodilator orders based on the Bronchodilator Assessment Score as indicated below.  Use Inhaler orders unless patient has one or more of the following: on home nebulizer, not able to hold breath for 10 seconds, is not alert and oriented, cannot activate and use MDI correctly, or respiratory rate 25 breaths per minute or more, then use the equivalent nebulizer order(s) with same Frequency and PRN reasons based on the score.  If a patient is on this medication at home then do not decrease Frequency below that used at home.    0-3 - enter or revise RT bronchodilator order(s) to equivalent  RT Bronchodilator order with Frequency of every 4 hours PRN for wheezing or increased work of breathing using Per Protocol order mode.        4-6 - enter or revise RT Bronchodilator order(s) to two equivalent RT bronchodilator orders with one order with BID Frequency and one order with Frequency of every 4 hours PRN wheezing or increased work of breathing using Per Protocol order mode.        7-10 - enter or revise RT Bronchodilator order(s) to two equivalent RT bronchodilator orders with one order with TID Frequency and one order with Frequency of every 4 hours PRN wheezing or increased work of breathing using Per Protocol order mode.       11-13 - enter or revise RT Bronchodilator order(s) to one equivalent RT bronchodilator order with QID Frequency and an Albuterol order with Frequency of every 4 hours PRN wheezing or increased work of breathing using Per Protocol order mode.      Greater than 13 - enter or revise RT Bronchodilator order(s) to one equivalent RT bronchodilator order with every 4 hours Frequency and an Albuterol order with Frequency of every 2 hours PRN wheezing or increased work of breathing using Per Protocol order mode.     RT to enter RT Home Evaluation for COPD & MDI Assessment order using Per Protocol order mode.    Electronically signed by Ayde Huber RCP on 11/12/2024 at 3:28 PM

## 2024-11-12 NOTE — PROGRESS NOTES
resorptive atelectasis involving portions of lower lobes. Superimposed pneumonia not excluded.      ----------PERT DATA----------      Data reported only if pulmonary embolism is present:      Most central extent of clot:   NA      RV/LV ratio:   NA      ----------PERT DATA----------         Electronically signed by Mustapha Jacques MD      CT HIP LEFT WO CONTRAST   Final Result      1.  Acute nondisplaced fracture at the anterior superior iliac spine.   2.  Prominent expansile osteolysis and significant loss of bone stock involving   the proximal one third of the femur and large area of pseudotumor, compatible   with particle disease. Question mild osteolysis about the acetabular cup.            Electronically signed by Minor Helms      XR CHEST PORTABLE   Final Result   No acute cardiopulmonary abnormality.      Electronically signed by Romeo Lopez      XR HIP 2-3 VW W PELVIS LEFT   Final Result   Status post bilateral total hip arthroplasty. No acute abnormality.      Electronically signed by Romeo Lopez          Date of onset: 11/8    Current Diet:  ADULT DIET; Regular  ADULT ORAL NUTRITION SUPPLEMENT; Breakfast, Dinner; Low Calorie/High Protein Oral Supplement      Treatment Diagnosis: dysphagia     Pain:  Denied pain     General:  Chart reviewed: Yes  Behavior/Cognition: oriented to self only   Communication Observation: limited verbalizations- pt struggling to breathe  Follows Directions: followed some commands   Dentition/Oral Mucosa: upper and lower dentures in place   Oral motor exam: WFL  Vocal Quality: very wet   Respiratory Status/oxygen requirements: oxygen via nasal canula  Vision/Hearing: questionable   Patient Complaint: pt without complaints  Patient Positioning: upright in chair   Prior Level of Function resides in a NH    Prior Dysphagia History:   Pt last seen 5/19/23 at that time it was recommended pt be on a regular diet.    MBS 5/19/23  Swallow WFL's.  Pt demonstrated adequate airway  goal      Goal 2: Patient will participate in repeat bedside swallowing assessment.        Education  Provided education to patient re: role of SLP, purpose of visit, and recommendations. Patient with questionable comprehension  comprehension.    Pt goal: pt did not state  Pt discharge goal: pt did not state     Total treatment time: 15 bedside swallowing eval     Plan: Continue per POC  Recommended Diet: NPO with oral care with suction with small amounts of ice chips.sips of water for comfort  Medication administration: via alternat means     Strategies: (with ice chips/water only)  Oral care with suction  upright  Small sips  One ice chip at a time   Discharge Recommendation: TBD closer to discharge   Discussed with Dwain REILLY   Needs met prior to leaving room, call light within reach    Siobhan Camejo MA, CCC-SLP [id number]  Speech-Language Pathologist  Pager  [phone]    This document will serve as a dc summary if pt dc prior to next visit

## 2024-11-12 NOTE — CONSULTS
Department of Orthopedic Surgery  Attending   Consult Note        Reason for Consult:  LEFT ASIS FX  Requesting Physician: John Mark DO  Date of Service: 11/12/2024 8:19 AM    CHIEF COMPLAINT:  As Above    History Obtained From:  patient    HISTORY OF PRESENT ILLNESS:                The patient is a 89 y.o. female who presents with above chief complaint.  Admitted post fall, known DM and pressure ulcer, felt to be mechanical fall.  Resides in SNF.  Moderate Left hip pain prompted CT     Past Medical History:        Diagnosis Date    CAD (coronary artery disease)     Diabetes mellitus (HCC)     Generalized weakness     Hyperlipidemia     Memory loss     Unspecified cerebral artery occlusion with cerebral infarction      Past Surgical History:        Procedure Laterality Date    APPENDECTOMY      CARDIAC SURGERY      HERNIA REPAIR      HYSTERECTOMY (CERVIX STATUS UNKNOWN)      JOINT REPLACEMENT           Medications Prior to Admission:   Prior to Admission medications    Medication Sig Start Date End Date Taking? Authorizing Provider   acetaminophen (TYLENOL) 325 MG tablet Take 2 tablets by mouth every 4 hours as needed for Pain (Pain 1-4; Do not exceed 3g/day from all sources)   Yes Danish Reilly MD   calcium carbonate (TUMS) 500 MG chewable tablet Take 1 tablet by mouth 2 times daily   Yes Danish Reilly MD   mirtazapine (REMERON) 15 MG tablet Take 1 tablet by mouth nightly   Yes Danish Reilly MD   Cholecalciferol (VITAMIN D3) 50 MCG (2000 UT) CAPS Take 1 capsule by mouth daily   Yes Danish Reilly MD   lisinopril (PRINIVIL;ZESTRIL) 10 MG tablet Take 1 tablet by mouth daily   Yes Danish Reilly MD   levothyroxine (SYNTHROID) 137 MCG tablet Take 1 tablet by mouth Daily   Yes Danish Reilly MD       Allergies:  Codeine, Iv dye [iodides], and Pcn [penicillins]    Social History:    Tobacco:  reports that she has been smoking cigarettes. She does not have any smokeless  Ginette Roche is a 48 year old female presenting with cough, sinus congestion, the sweats and fever.    Symptoms started 6 days ago.    OTC medications used:    None  Denies  known Latex allergy or symptoms of Latex sensitivity.  Social History     Tobacco Use   Smoking Status Never Smoker   Smokeless Tobacco Never Used     All allergies and medications reviewed.  PCP verified:   Dr. Galindo  Pharmacy verified:    [facility] in [place]  Patient would like communication of their results via:        Cell Phone:   Telephone Information:   Mobile [phone]     Okay to leave a message containing results? Yes     particle disease. Question mild osteolysis about the acetabular cup.            Electronically signed by Minor Helms      XR CHEST PORTABLE   Final Result   No acute cardiopulmonary abnormality.      Electronically signed by Romeo Lopez      XR HIP 2-3 VW W PELVIS LEFT   Final Result   Status post bilateral total hip arthroplasty. No acute abnormality.      Electronically signed by Romeo Lopez             IMPRESSION/RECOMMENDATIONS:    Assessment: LEFT Hip non displaced ASIS Fx    Plan:  1) Likely avulsion mechanism sartorius and contusion.  Stable pattern.  Rec WBAT, no hip restrictions.  PT/OT.  Fall risk reduction program.  Metabolic bone health optimization.     Rec f/u Xrays in 6 weeks,         Thank you for the opportunity to consult on this patient.    Ed Stratton MD

## 2024-11-12 NOTE — PROGRESS NOTES
Patient is alert and oriented to Self . VSS on 2 L O2. Medications given per MAR, no side effects noted. Patient assisted with turn every 2 hours. No complaints of pain, continuing to monitor and manage per MAR. Voiding via external catheter ( pure wick)this shift.      Patient is currently resting in bed with bed alarm on for safety. Call light within reach and all fall precautions in place. Plan of care continues.

## 2024-11-12 NOTE — PROGRESS NOTES
Patient is alert and oriented to self only. Patient remains on 2L O2, BP running soft. Patient tachy on tele. EKG completed this shift. Medications given per MAR, no side effects noted. Voiding via purewick. PRN medications given for pain. Assisted with q2 turns.      Patient is currently resting in bed with bed alarm on for safety. Call light within reach and all fall precautions in place. Plan of care continues.

## 2024-11-12 NOTE — PLAN OF CARE
Problem: Safety - Adult  Goal: Free from fall injury  11/11/2024 2232 by Griselda Espinosa RN  Outcome: Progressing

## 2024-11-12 NOTE — CARE COORDINATION
Case management is following for discharge planning. The chart was reviewed. Ms. Guillen is from Assisted Living where she experienced a fall. She was seen by ortho this morning. No surgical interventions at this time. WBAT. PTOT evaluations are pending.    A call was placed to Lashawn in admissions at [facility]. [phone]. A referral was given for her to watch for short term SNF before returning to her AL apt. Lashawn stated she will follow and a bed will be available tomorrow if needed. [employer] is the primary payor.    Therapy recommendations are pending.    Summer Meraz, RN  [phone]

## 2024-11-12 NOTE — PROGRESS NOTES
Physical Therapy  Facility/Department: [facility] 5T ORTHO/NEURO  Physical Therapy Initial Assessment    Name: Shanice Guillen  : 1932  MRN: [id number]  Date of Service: 2024    Discharge Recommendations:  Subacute/Skilled Nursing Facility   PT Equipment Recommendations  Equipment Needed: No  Other: defer      Patient Diagnosis(es): The primary encounter diagnosis was Closed fracture of left anterior superior iliac spine, initial encounter ([facility]). A diagnosis of Hypoxemia was also pertinent to this visit.  Past Medical History:  has a past medical history of CAD (coronary artery disease), Diabetes mellitus ([facility]), Generalized weakness, Hyperlipidemia, Memory loss, and Unspecified cerebral artery occlusion with cerebral infarction.  Past Surgical History:  has a past surgical history that includes Hysterectomy; joint replacement; Appendectomy; hernia repair; and Cardiac surgery.    Assessment  Body Structures, Functions, Activity Limitations Requiring Skilled Therapeutic Intervention: Decreased functional mobility ;Decreased endurance;Decreased cognition;Decreased balance;Decreased strength;Decreased safe awareness  Assessment: pt is a 89 yo female with hx of dementia admitted s/p fall; L hip CT: (+) Acute nondisplaced fracture at the anterior superior iliac spine. pt from [facility] at baseline and ambulates with RW, unknown how much assist provided at [facility] 2/2 pt cognitive deficits. Pt demonstrating ability to perform bed mobility with CGA and stand to RW with min A however 2/2 weakness and LLE fx pt requiring mod A of 2 to take steps to chair at RW and unable to tolerate further mobility. pt very confused on eval believing she is 62 and home with . pt is a high fall risk and unsafe to mobilize alone, pt will benefit from further IP PT at dc to maximize independence. PT to f/u  Treatment Diagnosis: impaired functional mobility  Therapy Prognosis: Fair  Decision Making: Medium Complexity  Requires PT Follow-Up:  term memory;Decreased recall of recent events  Safety Judgement: Decreased awareness of need for assistance;Decreased awareness of need for safety  Problem Solving: Assistance required to implement solutions;Assistance required to identify errors made;Assistance required to correct errors made;Assistance required to generate solutions  Insights: Decreased awareness of deficits  Initiation: Requires cues for some  Sequencing: Requires cues for some  Cognition Comment: perseverating, believes she is 62 and lives at home with her     Objective  Temp: 97.7 °F (36.5 °C)  Pulse: 94  Heart Rate Source: Monitor  Respirations: 20  SpO2: 93 %  O2 Device: Nasal cannula  BP: (!) 164/78  MAP (Calculated): 107  BP Location: Left upper arm  BP Method: Automatic  Patient Position: Semi fowlers                Strength RLE  Comment: gross weakness noted with functional mobility  Strength LLE  Comment: gross weakness noted with functional mobility           Bed mobility  Supine to Sit: Contact guard assistance  Scooting: Contact guard assistance  Bed Mobility Comments: pt mildly impulsive requiring cues to remain seated  Transfers  Sit to Stand: Minimal Assistance (at RW from EOB with cues for hand placement and ant WS)  Stand to Sit: Minimal Assistance  Bed to Chair: 2 Person Assistance;Moderate assistance (stand step at RW with antalgic steps, mod A of 2 with posterior lean and flexed posture and difficulty managing RW)  Ambulation  WB Status: WBAT  Ambulation  Surface: Level tile  Device: Rolling Walker  Assistance: 2 Person assistance;Moderate assistance  Quality of Gait: unsteady, antalgic with dec WB through LLE, flexed posture  Gait Deviations: Slow Josephine;Decreased step length;Decreased step height;Shuffles  Distance: 2'  Comments: unable to tolerate further than to chair this date     Balance  Posture: Fair  Sitting - Static: Fair  Sitting - Dynamic: Fair;- (close CGA EOB 2/2 impulsivity)  Standing - Static: Fair;-

## 2024-11-12 NOTE — PROGRESS NOTES
SpO2 94%   BMI 20.80 kg/m²       Diet: Diet NPO Exceptions are: Sips of Water with Meds, Ice Chips    DVT Prophylaxis: [x]PPx LMWH  []SQ Heparin  []IPC/SCDs  []Eliquis  []Xarelto  []Coumadin  [] Heparin Drip  []Other -      Code status: Limited    PT/OT Eval Status:   []NOT yet ordered  [x]Ordered and Pending   []Seen with Recommendations for:   []Home independently  []Home w/ assist  []HHC  []SNF  []Acute Rehab    Multi-Disciplinary Rounds with Case Management completed on 11/12/2024 with the following recommendations:  Anticipated Discharge Location: []Home w/ []HHC vs []SNF  []Acute Rehab  []LTAC  []Hospice  [x]Other -  tbd  Anticipated Discharge Day/Date:  tbd  Barriers to Discharge: ortho eval  --------------------------------------------------    MDM (any 2 required for High level billing)    A. Problems (any 1)  [] Acute/Chronic Illness/injury posing ongoing threat to life and/or bodily function without ongoing treatment    [] Severe exacerbation of chronic illness    --------------------------------------------------  B. Risk of Treatment (any 1)    [x] Drugs/treatments that require intensive monitoring for toxicity    [x] IV ABX (Vancomycin, Aminoglycosides, etc)     [] Post-Cath/Contrast study requiring serial monitoring    [] IV Narcotic analgesia    [] Aggressive IV diuresis    [] Hypertonic Saline    [] Critical electrolyte abnormalities requiring IV replacement    [] Insulin - Scheduled/SSI or Insulin gtt    [] Anticoagulation (Heparin gtt or Coumadin - other anticoagulants in special circumstances)    [] Cardiac Medications (IV Amiodarone/Diltiazem, Tikosyn, etc)    [] Hemodialysis    [] Other -    [] Change in code status    [] Decision to escalate care    [] Major surgery/procedure with associated risk factors    --------------------------------------------------  C. Data (any 2)    [] Data Review (any 3)    [] Consultant notes from yesterday/today    [x] All available current labs reviewed  interpreted for clinical significance    [x] Appropriate follow-up labs were ordered  [] Collateral history obtained     [x] Independent Interpretation of tests (any 1)    [x] Telemetry (Rhythm Strip) personally reviewed and interpreted        [] Imaging personally reviewed and interpreted     [x] Discussion (any 1)  [x] Multi-Disciplinary Rounds with Case Management  [] Discussed management of the case with           Labs:  Personally reviewed on 11/12/2024 and interpreted for clinical significance as documented above.     Recent Labs     11/11/24 0108 11/11/24 0728 11/12/24  0632   WBC 8.3 7.3 5.9   HGB 11.4* 11.1* 10.6*   HCT 35.5* 33.8* 32.2*    172 200     Recent Labs     11/11/24 0108 11/11/24  0728 11/12/24  0632   * 133* 138   K 4.1 4.2 3.7   CL 99 101 105   CO2 24 21 23   BUN 28* 31* 26*   CREATININE 1.3* 1.2 0.8   CALCIUM 9.0 8.4 8.7   MG  --  1.87  --      No results for input(s): \"PROBNP\", \"TROPHS\" in the last 72 hours.    No results for input(s): \"LABA1C\" in the last 72 hours.  Recent Labs     11/11/24 0108   AST 20   ALT 10   BILITOT 0.9   ALKPHOS 80     Recent Labs     11/11/24 0109   LACTA 1.5       Urine Cultures: No results found for: \"LABURIN\"  Blood Cultures:   Lab Results   Component Value Date/Time    BC  11/11/2024 01:08 AM     No Growth to date.  Any change in status will be called.     Lab Results   Component Value Date/Time    BLOODCULT2  11/11/2024 01:08 AM     No Growth to date.  Any change in status will be called.     Organism: No results found for: \"ORG\"      John Mark, DO

## 2024-11-12 NOTE — PROGRESS NOTES
Physician Progress Note      PATIENT:               TATUM SUÁREZ  CSN #:                  [id number]  :                       1932  ADMIT DATE:       2024 11:46 AM  DISCH DATE:  RESPONDING  PROVIDER #:        John Mark DO          QUERY TEXT:    Pt admitted with nondisplaced fracture of left hip. Pt noted to have   osteopenic bones on hip XR. If possible, please document in progress notes and   discharge summary if you are evaluating and/or treating any of the following:    The medical record reflects the following:  Risk Factors: 89 yr old female, dementia, witnessed fall with resulting left   hip pain , pneumonia  Clinical Indicators: Per XR hip -The bones are osteopenic.  Treatment: Ortho consult, imaging, metabolic bone health optimization rec per   ortho, PT/OT eval for WBAT and fall reduction program  Options provided:  -- Osteoporotic anterior superior iliac spine fracture following fall which   would not usually break a normal, healthy bone  -- Other - I will add my own diagnosis  -- Disagree - Not applicable / Not valid  -- Disagree - Clinically unable to determine / Unknown  -- Refer to Clinical Documentation Reviewer    PROVIDER RESPONSE TEXT:    This patient has an osteoporotic anterior superior iliac spine fracture   following fall which would not break a normal, healthy bone.    Query created by: Stephani Boateng on 2024 10:17 AM      QUERY TEXT:    Pt admitted with nondisplaced fracture of anterior superior iliac spine. Pt   noted to have a pressure stage 2 coccyx ulcer per Wound RN consult . If   possible, please document in progress notes and discharge summary the present   on admission status of coccyx ulcer:    The medical record reflects the following:  Risk Factors: 89 yr old, dementia, DM, incontinence, chronic pressure    Clinical Indicators: Per Wound RN note -\"Coccyx - Stage 2...Wound Type:   pressure\"    Treatment: Wound RN, skin care/incontinence

## 2024-11-12 NOTE — PROGRESS NOTES
Occupational Therapy  Facility/Department: [facility] 5T ORTHO/NEURO  Occupational Therapy Initial Assessment and Tx    Name: Shanice Guillen  : 1932  MRN: [id number]  Date of Service: 2024    Discharge Recommendations:  Subacute/Skilled Nursing Facility  OT Equipment Recommendations  Other: defer       Patient Diagnosis(es): The primary encounter diagnosis was Closed fracture of left anterior superior iliac spine, initial encounter ([facility]). A diagnosis of Hypoxemia was also pertinent to this visit.  Past Medical History:  has a past medical history of CAD (coronary artery disease), Diabetes mellitus ([facility]), Generalized weakness, Hyperlipidemia, Memory loss, and Unspecified cerebral artery occlusion with cerebral infarction.  Past Surgical History:  has a past surgical history that includes Hysterectomy; joint replacement; Appendectomy; hernia repair; and Cardiac surgery.           Assessment  Performance deficits / Impairments: Decreased safe awareness;Decreased balance;Decreased functional mobility ;Decreased cognition;Decreased ADL status;Decreased endurance;Decreased strength  Assessment: Pt is 89 y.o female who presents from [facility] with ASIS fx. Pt presents below her baseline and demos decreased activity tolerance, global deconditioning decreased balance, decreased cognition, and generalized weakness impacting ADL performance. Pt requires assist of 1-2 for functional transfers and maxA for LB ADLs. Pt will benefit from continued skilled OT to address above deficits and maximize independence/safety with functional activity.  Prognosis: Good  Decision Making: Medium Complexity  REQUIRES OT FOLLOW-UP: Yes  Activity Tolerance  Activity Tolerance: Patient limited by fatigue;Treatment limited secondary to decreased cognition     Plan  Occupational Therapy Plan  Times Per Week: 2-5x  Current Treatment Recommendations: Strengthening, Functional mobility training, Balance training, Safety education & training, Self-Care /  safety)  Balance  Sitting: High guard (CGA)  Standing: Impaired (up to mod A x2)  Transfer Training  Transfer Training: Yes  Overall Level of Assistance: Minimum assistance  Interventions: Verbal cues;Safety awareness training  Sit to Stand: Minimum assistance  Stand to Sit: Minimum assistance  Bed to Chair: Moderate assistance;Assist X2 (with RW with noted difficulty weight shifting; assist with RW management)     AROM: Generally decreased, functional  PROM: Generally decreased, functional  Strength: Generally decreased, functional  Coordination: Generally decreased, functional  Tone: Normal  Sensation: Intact  ADL  LE Dressing: Maximum assistance  LE Dressing Skilled Clinical Factors: to don socks  Skin Care: Bath wipes     Activity Tolerance  Activity Tolerance: Patient tolerated evaluation without incident;Treatment limited secondary to decreased cognition;Patient limited by endurance        Vision  Vision: Impaired  Vision Exceptions: Wears glasses at all times  Hearing  Hearing: Within functional limits  Cognition  Overall Cognitive Status: Exceptions  Following Commands: Follows one step commands with increased time;Follows one step commands with repetition  Attention Span: Attends with cues to redirect  Memory: Decreased short term memory;Decreased recall of recent events  Safety Judgement: Decreased awareness of need for assistance;Decreased awareness of need for safety  Problem Solving: Assistance required to implement solutions;Assistance required to identify errors made;Assistance required to correct errors made;Assistance required to generate solutions  Insights: Decreased awareness of deficits  Initiation: Requires cues for some  Sequencing: Requires cues for some  Cognition Comment: perseverating, believes she is 62 and lives at home with her   Orientation  Orientation Level: Oriented to person;Disoriented to place;Disoriented to time;Disoriented to situation                  Education Given

## 2024-11-12 NOTE — PLAN OF CARE
Problem: SLP Adult - Impaired Swallowing  Goal: By Discharge: Advance to least restrictive diet without signs or symptoms of aspiration for planned discharge setting.  See evaluation for individualized goals.  Outcome: Progressing  Note: By Discharge: Advance to least restrictive diet without signs or symptoms of aspiration for planned discharge setting. See evaluation for individualized goals.

## 2024-11-12 NOTE — PROGRESS NOTES
Comprehensive Nutrition Assessment    RECOMMENDATIONS:  PO Diet: Continue Regular  Nutrition Supplement: Begin Ensure Max bid(prefers [employer]).  Nutrition Education: No recommendation at this time     NUTRITION ASSESSMENT:   Nutritional summary & status: Positive nutrition screen for wound. Pt admitted from AL following mechanical fall. CT showing left hip with acute nondisplaced fracture of the anterior superior iliac spine. Pt reports fair appetite with meal intakes of 1-25%, 51-75%, and %. EMR showing wt gain in past 9 months; no loss. Increased nutrient needs identified to promote healing of St 2 PI on coccyx. Pt agreeable to receive Ensure supplement to promote adequate nutrition and assist with wound healing. Continue to monitor.   Admission // PMH: Closed avulsion fx of anterior superior iliac spine of pelvis//CAD, DM, HLD, Memory loss    MALNUTRITION ASSESSMENT  Context of Malnutrition: Acute Illness   Malnutrition Status: At risk for malnutrition  Findings of the 6 clinical characteristics of malnutrition (Minimum of 2 out of 6 clinical characteristics is required to make the diagnosis of moderate or severe Protein Calorie Malnutrition based on AND/ASPEN Guidelines):  Energy Intake:  Mild decrease in energy intake  Weight Loss:  No weight loss     Body Fat Loss:  No body fat loss     Muscle Mass Loss:  No muscle mass loss (difficult to assess due to sarcopenia)      NUTRITION DIAGNOSIS   Increased nutrient needs related to increase demand for energy/nutrients as evidenced by wounds    Nutrition Monitoring and Evaluation:   Food/Nutrient Intake Outcomes:  Food and Nutrient Intake, Supplement Intake  Physical Signs/Symptoms Outcomes:  Biochemical Data, Nutrition Focused Physical Findings, Skin, Weight     OBJECTIVE DATA: Significant to nutrition assessment  Nutrition Related Findings: LBM 11/08. No edema. Labs reviewed.  Wounds: Pressure Injury, Stage II (coccyx)  Nutrition Goals: Meet at least 75%  of estimated needs, by next RD assessment     CURRENT NUTRITION THERAPIES  ADULT DIET; Regular  PO Intake: 1-25%, 51-75%, %   PO Supplement Intake:None Ordered  Additional Sources of Calories/IVF:n/a     COMPARATIVE STANDARDS  Energy (kcal):  9820-4700 (25-30 kcal/CBW)     Protein (g):  57-72 (1.2-1.5 gm/CBW)       Fluid (ml/day):  or per provider    ANTHROPOMETRICS  Current Height: 152.4 cm (5')  Current Weight - Scale: 48.3 kg (106 lb 7.7 oz)    Admission weight: 48.3 kg (106 lb 7.7 oz)    The patient will be monitored per nutrition standards of care. Consult dietitian if additional nutrition interventions are needed prior to RD reassessment.     Epi Pate RD  [place]:  [phone]

## 2024-11-13 ENCOUNTER — APPOINTMENT (OUTPATIENT)
Dept: GENERAL RADIOLOGY | Age: 89
DRG: 542 | End: 2024-11-13
Payer: MEDICARE

## 2024-11-13 LAB
ANION GAP SERPL CALCULATED.3IONS-SCNC: 12 MMOL/L (ref 3–16)
BASOPHILS # BLD: 0 K/UL (ref 0–0.2)
BASOPHILS NFR BLD: 0.4 %
BUN SERPL-MCNC: 21 MG/DL (ref 7–20)
CALCIUM SERPL-MCNC: 9.2 MG/DL (ref 8.3–10.6)
CHLORIDE SERPL-SCNC: 106 MMOL/L (ref 99–110)
CO2 SERPL-SCNC: 21 MMOL/L (ref 21–32)
CREAT SERPL-MCNC: 0.7 MG/DL (ref 0.6–1.2)
DEPRECATED RDW RBC AUTO: 15.2 % (ref 12.4–15.4)
EOSINOPHIL # BLD: 0 K/UL (ref 0–0.6)
EOSINOPHIL NFR BLD: 0.8 %
GFR SERPLBLD CREATININE-BSD FMLA CKD-EPI: 81 ML/MIN/{1.73_M2}
GLUCOSE SERPL-MCNC: 105 MG/DL (ref 70–99)
HCT VFR BLD AUTO: 36.3 % (ref 36–48)
HGB BLD-MCNC: 11.6 G/DL (ref 12–16)
LYMPHOCYTES # BLD: 0.5 K/UL (ref 1–5.1)
LYMPHOCYTES NFR BLD: 10.4 %
MCH RBC QN AUTO: 29.1 PG (ref 26–34)
MCHC RBC AUTO-ENTMCNC: 32 G/DL (ref 31–36)
MCV RBC AUTO: 90.7 FL (ref 80–100)
MONOCYTES # BLD: 0.5 K/UL (ref 0–1.3)
MONOCYTES NFR BLD: 10.5 %
NEUTROPHILS # BLD: 3.9 K/UL (ref 1.7–7.7)
NEUTROPHILS NFR BLD: 77.9 %
PLATELET # BLD AUTO: 235 K/UL (ref 135–450)
PMV BLD AUTO: 7.5 FL (ref 5–10.5)
POTASSIUM SERPL-SCNC: 3.8 MMOL/L (ref 3.5–5.1)
PROCALCITONIN SERPL IA-MCNC: 2.87 NG/ML (ref 0–0.15)
RBC # BLD AUTO: 4 M/UL (ref 4–5.2)
SODIUM SERPL-SCNC: 139 MMOL/L (ref 136–145)
WBC # BLD AUTO: 5.1 K/UL (ref 4–11)

## 2024-11-13 PROCEDURE — 2580000003 HC RX 258

## 2024-11-13 PROCEDURE — 6370000000 HC RX 637 (ALT 250 FOR IP): Performed by: INTERNAL MEDICINE

## 2024-11-13 PROCEDURE — 36415 COLL VENOUS BLD VENIPUNCTURE: CPT

## 2024-11-13 PROCEDURE — 2700000000 HC OXYGEN THERAPY PER DAY

## 2024-11-13 PROCEDURE — 1200000000 HC SEMI PRIVATE

## 2024-11-13 PROCEDURE — 6370000000 HC RX 637 (ALT 250 FOR IP)

## 2024-11-13 PROCEDURE — 74230 X-RAY XM SWLNG FUNCJ C+: CPT

## 2024-11-13 PROCEDURE — 92526 ORAL FUNCTION THERAPY: CPT

## 2024-11-13 PROCEDURE — 6360000002 HC RX W HCPCS

## 2024-11-13 PROCEDURE — 92611 MOTION FLUOROSCOPY/SWALLOW: CPT

## 2024-11-13 PROCEDURE — 85025 COMPLETE CBC W/AUTO DIFF WBC: CPT

## 2024-11-13 PROCEDURE — 80048 BASIC METABOLIC PNL TOTAL CA: CPT

## 2024-11-13 PROCEDURE — 84145 PROCALCITONIN (PCT): CPT

## 2024-11-13 PROCEDURE — 2580000003 HC RX 258: Performed by: INTERNAL MEDICINE

## 2024-11-13 PROCEDURE — 94761 N-INVAS EAR/PLS OXIMETRY MLT: CPT

## 2024-11-13 PROCEDURE — 2500000003 HC RX 250 WO HCPCS

## 2024-11-13 PROCEDURE — 94640 AIRWAY INHALATION TREATMENT: CPT

## 2024-11-13 PROCEDURE — 94669 MECHANICAL CHEST WALL OSCILL: CPT

## 2024-11-13 RX ORDER — GUAIFENESIN 200 MG/10ML
200 LIQUID ORAL 2 TIMES DAILY
Status: DISCONTINUED | OUTPATIENT
Start: 2024-11-13 | End: 2024-11-18 | Stop reason: HOSPADM

## 2024-11-13 RX ADMIN — IPRATROPIUM BROMIDE AND ALBUTEROL SULFATE 1 DOSE: 2.5; .5 SOLUTION RESPIRATORY (INHALATION) at 08:12

## 2024-11-13 RX ADMIN — AZITHROMYCIN DIHYDRATE 500 MG: 250 TABLET, FILM COATED ORAL at 10:39

## 2024-11-13 RX ADMIN — METOPROLOL TARTRATE 12.5 MG: 25 TABLET, FILM COATED ORAL at 10:36

## 2024-11-13 RX ADMIN — HEPARIN SODIUM 5000 UNITS: 5000 INJECTION INTRAVENOUS; SUBCUTANEOUS at 10:28

## 2024-11-13 RX ADMIN — LEVOTHYROXINE SODIUM 137 MCG: 0.14 TABLET ORAL at 06:54

## 2024-11-13 RX ADMIN — HEPARIN SODIUM 5000 UNITS: 5000 INJECTION INTRAVENOUS; SUBCUTANEOUS at 20:25

## 2024-11-13 RX ADMIN — IPRATROPIUM BROMIDE AND ALBUTEROL SULFATE 1 DOSE: 2.5; .5 SOLUTION RESPIRATORY (INHALATION) at 20:12

## 2024-11-13 RX ADMIN — LISINOPRIL 10 MG: 10 TABLET ORAL at 15:39

## 2024-11-13 RX ADMIN — METOPROLOL TARTRATE 12.5 MG: 25 TABLET, FILM COATED ORAL at 20:24

## 2024-11-13 RX ADMIN — SODIUM CHLORIDE, PRESERVATIVE FREE 10 ML: 5 INJECTION INTRAVENOUS at 10:19

## 2024-11-13 RX ADMIN — MIRTAZAPINE 15 MG: 15 TABLET, FILM COATED ORAL at 20:25

## 2024-11-13 RX ADMIN — GUAIFENESIN 200 MG: 100 SOLUTION ORAL at 20:25

## 2024-11-13 RX ADMIN — SODIUM CHLORIDE, PRESERVATIVE FREE 10 ML: 5 INJECTION INTRAVENOUS at 20:25

## 2024-11-13 RX ADMIN — OXYCODONE 2.5 MG: 5 TABLET ORAL at 23:52

## 2024-11-13 RX ADMIN — CEFTRIAXONE 1000 MG: 1 INJECTION, POWDER, FOR SOLUTION INTRAMUSCULAR; INTRAVENOUS at 10:20

## 2024-11-13 ASSESSMENT — PAIN DESCRIPTION - LOCATION: LOCATION: LEG

## 2024-11-13 ASSESSMENT — PAIN SCALES - GENERAL: PAINLEVEL_OUTOF10: 4

## 2024-11-13 ASSESSMENT — PAIN DESCRIPTION - DESCRIPTORS: DESCRIPTORS: ACHING;DISCOMFORT

## 2024-11-13 ASSESSMENT — PAIN DESCRIPTION - PAIN TYPE: TYPE: CHRONIC PAIN

## 2024-11-13 ASSESSMENT — PAIN - FUNCTIONAL ASSESSMENT: PAIN_FUNCTIONAL_ASSESSMENT: ACTIVITIES ARE NOT PREVENTED

## 2024-11-13 ASSESSMENT — PAIN DESCRIPTION - FREQUENCY: FREQUENCY: CONTINUOUS

## 2024-11-13 ASSESSMENT — PAIN DESCRIPTION - ORIENTATION: ORIENTATION: UPPER

## 2024-11-13 ASSESSMENT — PAIN DESCRIPTION - ONSET: ONSET: ON-GOING

## 2024-11-13 NOTE — PROGRESS NOTES
Pt. Oriented to self only. VSS on 2L nasal cannula. Pt. Managing pain per MAR. All fall precautions in place and call light is within reach.

## 2024-11-13 NOTE — PROGRESS NOTES
[facility] Medicine Progress Note  V 10.25      Date of Admission: 11/8/2024    Hospital Day: 6      Chief Admission Complaint:   fall, witnessed to be mechanical     Subjective:  Patient seen and examined at bedside. No acute events overnight. Patient alert, oriented today. States she feels okay, breathing feels okay as well. Denies any fevers, chills, chest pain, abdominal pain.     Presenting Admission History:       Shanice Guillen is a 89 y.o. female with pmh as mentioned above presents from her nursing care facility after a witnessed fall earlier today.  History is limited and obtained from EMS primarily-patient has tripped and fallen which was witnessed by the nursing home staff.  Patient did not strike her head or lose consciousness.  Patient is not on any anticoagulation or antiplatelets.  Patient has reportedly complaining of left hip pain prior to transfer to ED.  Patient denies any pain currently and feels well.  Denies lightheadedness or dizziness prior to the fall    Assessment/Plan:      Current Principal Problem:  Closed avulsion fracture of anterior superior iliac spine of pelvis (HCC)    Plan:    Witnessed mechanical fall  Left hip pain  CT left hip with acute nondisplaced fracture of the anterior superior iliac spine  - Pain relief as ordered  - Social service and case management consults. From assisted living at the [facility]  - Orthopedics evaluated patient, no surgical intervention. Okay for WBAT. PT/OT evaluations, recommending SNF     Hypoxia  CAP, POA  - CTPE done, showed no acute PE. Does show extensive opacification bronchus intermedius and bilateral lower lobe bronchi most compatible with large mucous secretions with associated resorptive atelectasis involving portions of lower lobes. Superimposed pneumonia not excluded  - Started on DVT ppx lovenox  - Ordered acapella, incentive spirometer to help with mucus plugging/atelectasis. Continue to monitor oxygen requirement. Wean as tolerated for

## 2024-11-13 NOTE — CARE COORDINATION
CM following: CM spoke with Lashawn at [facility] at [facility] who reports they would require private duty care for the first 48 hours for the pt's transition to SNF. CM updated pt's daughter, Ketty on this requirement on the phone. Ketty appropriately shared frustration with this, but reports she wants her mother to go to [facility] thus Ketty will call [facility] [phone] to make arrangements to pay for private duty care and has requested that the CM keep in touch with [facility] once pt is cleared to discharge about start of care at [facility]. CM updated Lashawn at [facility] who will continue to follow. CM will continue to follow for discharge planning.  Electronically signed by SANTI Benedict on 11/13/2024 at 3:22 PM  [phone]

## 2024-11-13 NOTE — PLAN OF CARE
Problem: Discharge Planning  Goal: Discharge to home or other facility with appropriate resources  11/13/2024 1324 by Beatrice Porras, RN  Outcome: Progressing     Problem: Safety - Adult  Goal: Free from fall injury  11/13/2024 1324 by Beatrice Porras, RN  Outcome: Progressing   All fall precautions in place. Bed locked and in lowest position with alarm on. Overbed table and personal belonings within reach. Call light within reach and patient instructed to use call light for assistance. Non-skid socks on.     Problem: Skin/Tissue Integrity  Goal: Absence of new skin breakdown  Description: 1.  Monitor for areas of redness and/or skin breakdown  2.  Assess vascular access sites hourly  3.  Every 4-6 hours minimum:  Change oxygen saturation probe site  4.  Every 4-6 hours:  If on nasal continuous positive airway pressure, respiratory therapy assess nares and determine need for appliance change or resting period.  11/13/2024 1324 by Beatrice Porras, RN  Outcome: Progressing   Skin assessed this shift. Skin is CDI. Fatoumata care completed as necessary. Turning patient q2h to reduce pressure and prevent injury.     Problem: ABCDS Injury Assessment  Goal: Absence of physical injury  11/13/2024 1324 by Beartice Porras, RN  Outcome: Progressing   Ambulation equipment in patient room. Call light within reach. +    Problem: Nutrition Deficit:  Goal: Optimize nutritional status  Outcome: Progressing   Patient NPO for barium swallow. Patient at risk for aspiration. Interventions include HOB elevated, incentive spirometry assisted by nurse and patient encouraged to deep breathe and cough.

## 2024-11-13 NOTE — PROCEDURES
INSTRUMENTAL SWALLOW REPORT  MODIFIED BARIUM SWALLOW/treatment/dc    NAME: Shanice Guillen     : 1932  MRN: [id number]       Date of Eval: 2024     Ordering Physician: Dr Mark  Referring Diagnosis: Dysphagia    Past Medical History:  has a past medical history of CAD (coronary artery disease), Diabetes mellitus (HCC), Generalized weakness, Hyperlipidemia, Memory loss, and Unspecified cerebral artery occlusion with cerebral infarction.  Past Surgical History:  has a past surgical history that includes Hysterectomy; joint replacement; Appendectomy; hernia repair; and Cardiac surgery.    Imaging  XR CHEST PORTABLE   Final Result   1.  Bibasilar atelectasis similar to recent CT.       Electronically signed by Mj Rinaldi MD         Prior MBS Results:   Pt last seen 23 at that time it was recommended pt be on a regular diet.     Patient Complaints/Reason for Referral:  Shanice Guillen was referred for a MBS to assess the efficiency of his/her swallow function, assess for aspiration, and to make recommendations regarding safe dietary consistencies, effective compensatory strategies, and safe eating environment.    Onset of problem:   24    Behavior/Cognition: alert  Vision/Hearing: kept eyes closed; hearing appeared functional for tasks presented    Prior MBS 23  Swallow WFL's.  Pt demonstrated adequate airway protection with no penetration/aspiration identified.  Swallow was efficient with no pharyngeal residue.  Radiology noted continued presence of Zenker's diverticulum - see radiology note  Recommend regular with thin liquids     Impressions:  Oral Phase  Slow but functional mastication, clears oral cavity effectively.  Pharyngeal Phase  Pt demonstrated adequate airway protection and swallow efficiency. No pen/aspiration or pharyngeal residue identified  Upper Esophageal Screen  Presence of Zenker's diverticulum present that was noted and identified in prior MBS studies, per  administration- as latricia    Compensatory Strategies:  Upright all meals and 30 min after    Discharge recommendations: no follow up by SLP indicated  Discussed with Beatrice REILLY  Staff present to monitor pt for safety    Electronically Signed by:  KAMALJIT NAJERA M.S./CCC-SLP #[id number]  Pg. # [phone]

## 2024-11-13 NOTE — PROGRESS NOTES
Patient oriented to self.    Vitals:    11/12/24 1626   BP: (!) 150/85   Pulse: 82   Resp: 20   Temp: 98.3 °F (36.8 °C)   SpO2: 95%     Pt NPO after signs of aspiration this afternoon. Has tolerated few ice chips since. Congested cough noted, encouraged incentive spirometry use. Nasotracheal suction performed; congestion improved. Patient up to commode x2 stand-pivot several times this shift. Several urine and bowel occurrences noted. Patient refusing telemetry monitoring and picks at and removes leads after every placement. Fall and safety precautions in place, video monitoring in place.

## 2024-11-13 NOTE — PLAN OF CARE
Problem: Discharge Planning  Goal: Discharge to home or other facility with appropriate resources  Outcome: Progressing  Note: Internal medicine and case management following. Patient updated and educated on barriers to discharge and plan of care.    Problem: Safety - Adult  Goal: Free from fall injury  Outcome: Progressing  Note: Patient has remained free of fall injury this shift. Patient up x2 stand-pivot. Gait belt and gripper socks applied for safety.     Problem: ABCDS Injury Assessment  Goal: Absence of physical injury  Outcome: Progressing  Note: Patient has remained free of physical injury this shift. Fall and safety precautions in place. Bed exit alarm activated, bed in lowest position with wheels locked, call light and belongings within reach.

## 2024-11-13 NOTE — PROGRESS NOTES
MBS    Strategies:    TBD after MBS    Discharge Recommendation: TBD   Discussed with RNBeatrice  Needs met prior to leaving room, call light within reach    KAMALJIT NAJERA M.S./CCC-SLP #[id number]  Pg. # [phone]  This document will serve as a dc summary if pt dc prior to next visit

## 2024-11-13 NOTE — PLAN OF CARE
Problem: Safety - Adult  Goal: Free from fall injury  11/12/2024 2319 by Colette Pichardo, RN  Outcome: Progressing  Note: All fall precautions in place and call light is within reach.      Problem: Pain  Goal: Verbalizes/displays adequate comfort level or baseline comfort level  11/12/2024 2319 by Colette Pichardo, RN  Outcome: Progressing  Note: Pt. Managing pain per MAR.

## 2024-11-14 LAB
ANION GAP SERPL CALCULATED.3IONS-SCNC: 9 MMOL/L (ref 3–16)
BASOPHILS # BLD: 0 K/UL (ref 0–0.2)
BASOPHILS NFR BLD: 0.6 %
BUN SERPL-MCNC: 18 MG/DL (ref 7–20)
CALCIUM SERPL-MCNC: 9.2 MG/DL (ref 8.3–10.6)
CHLORIDE SERPL-SCNC: 103 MMOL/L (ref 99–110)
CO2 SERPL-SCNC: 27 MMOL/L (ref 21–32)
CREAT SERPL-MCNC: 0.6 MG/DL (ref 0.6–1.2)
DEPRECATED RDW RBC AUTO: 14.2 % (ref 12.4–15.4)
EOSINOPHIL # BLD: 0.1 K/UL (ref 0–0.6)
EOSINOPHIL NFR BLD: 1.3 %
GFR SERPLBLD CREATININE-BSD FMLA CKD-EPI: 84 ML/MIN/{1.73_M2}
GLUCOSE SERPL-MCNC: 129 MG/DL (ref 70–99)
HCT VFR BLD AUTO: 35.6 % (ref 36–48)
HGB BLD-MCNC: 11.7 G/DL (ref 12–16)
LYMPHOCYTES # BLD: 0.7 K/UL (ref 1–5.1)
LYMPHOCYTES NFR BLD: 11.9 %
MCH RBC QN AUTO: 28.8 PG (ref 26–34)
MCHC RBC AUTO-ENTMCNC: 32.7 G/DL (ref 31–36)
MCV RBC AUTO: 87.9 FL (ref 80–100)
MONOCYTES # BLD: 0.8 K/UL (ref 0–1.3)
MONOCYTES NFR BLD: 12.6 %
NEUTROPHILS # BLD: 4.4 K/UL (ref 1.7–7.7)
NEUTROPHILS NFR BLD: 73.6 %
PLATELET # BLD AUTO: 314 K/UL (ref 135–450)
PMV BLD AUTO: 7.7 FL (ref 5–10.5)
POTASSIUM SERPL-SCNC: 3.9 MMOL/L (ref 3.5–5.1)
RBC # BLD AUTO: 4.06 M/UL (ref 4–5.2)
SODIUM SERPL-SCNC: 139 MMOL/L (ref 136–145)
WBC # BLD AUTO: 6 K/UL (ref 4–11)

## 2024-11-14 PROCEDURE — 2500000003 HC RX 250 WO HCPCS

## 2024-11-14 PROCEDURE — 94761 N-INVAS EAR/PLS OXIMETRY MLT: CPT

## 2024-11-14 PROCEDURE — 94669 MECHANICAL CHEST WALL OSCILL: CPT

## 2024-11-14 PROCEDURE — 51798 US URINE CAPACITY MEASURE: CPT

## 2024-11-14 PROCEDURE — 6360000002 HC RX W HCPCS

## 2024-11-14 PROCEDURE — 6370000000 HC RX 637 (ALT 250 FOR IP)

## 2024-11-14 PROCEDURE — 6370000000 HC RX 637 (ALT 250 FOR IP): Performed by: INTERNAL MEDICINE

## 2024-11-14 PROCEDURE — 2580000003 HC RX 258

## 2024-11-14 PROCEDURE — 2580000003 HC RX 258: Performed by: INTERNAL MEDICINE

## 2024-11-14 PROCEDURE — 85025 COMPLETE CBC W/AUTO DIFF WBC: CPT

## 2024-11-14 PROCEDURE — 51701 INSERT BLADDER CATHETER: CPT

## 2024-11-14 PROCEDURE — 36415 COLL VENOUS BLD VENIPUNCTURE: CPT

## 2024-11-14 PROCEDURE — 1200000000 HC SEMI PRIVATE

## 2024-11-14 PROCEDURE — 94640 AIRWAY INHALATION TREATMENT: CPT

## 2024-11-14 PROCEDURE — 2700000000 HC OXYGEN THERAPY PER DAY

## 2024-11-14 PROCEDURE — 80048 BASIC METABOLIC PNL TOTAL CA: CPT

## 2024-11-14 RX ORDER — SODIUM CHLORIDE FOR INHALATION 3 %
4 VIAL, NEBULIZER (ML) INHALATION ONCE
Status: COMPLETED | OUTPATIENT
Start: 2024-11-14 | End: 2024-11-14

## 2024-11-14 RX ORDER — ENOXAPARIN SODIUM 100 MG/ML
30 INJECTION SUBCUTANEOUS DAILY
Status: DISCONTINUED | OUTPATIENT
Start: 2024-11-14 | End: 2024-11-18 | Stop reason: HOSPADM

## 2024-11-14 RX ORDER — ALBUTEROL SULFATE 0.83 MG/ML
2.5 SOLUTION RESPIRATORY (INHALATION) EVERY 4 HOURS PRN
Status: DISCONTINUED | OUTPATIENT
Start: 2024-11-14 | End: 2024-11-18 | Stop reason: HOSPADM

## 2024-11-14 RX ADMIN — GUAIFENESIN 200 MG: 100 SOLUTION ORAL at 10:30

## 2024-11-14 RX ADMIN — ENOXAPARIN SODIUM 30 MG: 100 INJECTION SUBCUTANEOUS at 20:38

## 2024-11-14 RX ADMIN — SODIUM CHLORIDE 30 MG/ML INHALATION SOLUTION 4 ML: 30 SOLUTION INHALANT at 14:55

## 2024-11-14 RX ADMIN — IPRATROPIUM BROMIDE AND ALBUTEROL SULFATE 1 DOSE: 2.5; .5 SOLUTION RESPIRATORY (INHALATION) at 10:02

## 2024-11-14 RX ADMIN — METOPROLOL TARTRATE 12.5 MG: 25 TABLET, FILM COATED ORAL at 20:34

## 2024-11-14 RX ADMIN — CEFTRIAXONE 1000 MG: 1 INJECTION, POWDER, FOR SOLUTION INTRAMUSCULAR; INTRAVENOUS at 08:25

## 2024-11-14 RX ADMIN — METOPROLOL TARTRATE 12.5 MG: 25 TABLET, FILM COATED ORAL at 08:25

## 2024-11-14 RX ADMIN — OXYCODONE 2.5 MG: 5 TABLET ORAL at 05:00

## 2024-11-14 RX ADMIN — SODIUM CHLORIDE, PRESERVATIVE FREE 10 ML: 5 INJECTION INTRAVENOUS at 08:26

## 2024-11-14 RX ADMIN — MIRTAZAPINE 15 MG: 15 TABLET, FILM COATED ORAL at 20:35

## 2024-11-14 RX ADMIN — LISINOPRIL 10 MG: 10 TABLET ORAL at 08:25

## 2024-11-14 RX ADMIN — IPRATROPIUM BROMIDE AND ALBUTEROL SULFATE 1 DOSE: 2.5; .5 SOLUTION RESPIRATORY (INHALATION) at 22:30

## 2024-11-14 RX ADMIN — HEPARIN SODIUM 5000 UNITS: 5000 INJECTION INTRAVENOUS; SUBCUTANEOUS at 08:25

## 2024-11-14 RX ADMIN — SODIUM CHLORIDE, PRESERVATIVE FREE 10 ML: 5 INJECTION INTRAVENOUS at 20:38

## 2024-11-14 RX ADMIN — GUAIFENESIN 200 MG: 100 SOLUTION ORAL at 20:41

## 2024-11-14 RX ADMIN — LEVOTHYROXINE SODIUM 137 MCG: 0.14 TABLET ORAL at 05:00

## 2024-11-14 RX ADMIN — ALBUTEROL SULFATE 2.5 MG: 2.5 SOLUTION RESPIRATORY (INHALATION) at 14:55

## 2024-11-14 ASSESSMENT — PAIN DESCRIPTION - LOCATION: LOCATION: LEG

## 2024-11-14 ASSESSMENT — PAIN SCALES - GENERAL
PAINLEVEL_OUTOF10: 2
PAINLEVEL_OUTOF10: 2
PAINLEVEL_OUTOF10: 4

## 2024-11-14 ASSESSMENT — PAIN - FUNCTIONAL ASSESSMENT: PAIN_FUNCTIONAL_ASSESSMENT: PREVENTS OR INTERFERES SOME ACTIVE ACTIVITIES AND ADLS

## 2024-11-14 ASSESSMENT — PAIN DESCRIPTION - DESCRIPTORS: DESCRIPTORS: ACHING;DISCOMFORT

## 2024-11-14 ASSESSMENT — PAIN DESCRIPTION - ORIENTATION: ORIENTATION: UPPER

## 2024-11-14 NOTE — PLAN OF CARE
Problem: Safety - Adult  Goal: Free from fall injury  11/13/2024 2139 by Heather Mullen, RN  Outcome: Progressing   All fall precautions in place. Bed locked and in lowest position with alarm on. Overbed table and personal belonings within reach. Call light within reach and patient instructed to use call light for assistance. Non-skid socks on.  Problem: Pain  Goal: Verbalizes/displays adequate comfort level or baseline comfort level  Outcome: Progressing   Patient not complaining of any pain at this time, will continue to monitor.

## 2024-11-14 NOTE — PLAN OF CARE
Problem: Chronic Conditions and Co-morbidities  Goal: Patient's chronic conditions and co-morbidity symptoms are monitored and maintained or improved  Outcome: Progressing     Problem: Discharge Planning  Goal: Discharge to home or other facility with appropriate resources  Outcome: Progressing     Problem: Safety - Adult  Goal: Free from fall injury  Outcome: Progressing  All safety precautions are in place; bed in lowest position, wheels locked, bed/chair alarm on, call light and personal belongings within reach.      Problem: Skin/Tissue Integrity  Goal: Absence of new skin breakdown  Description: 1.  Monitor for areas of redness and/or skin breakdown  2.  Assess vascular access sites hourly  3.  Every 4-6 hours minimum:  Change oxygen saturation probe site  4.  Every 4-6 hours:  If on nasal continuous positive airway pressure, respiratory therapy assess nares and determine need for appliance change or resting period.  Outcome: Progressing     Problem: ABCDS Injury Assessment  Goal: Absence of physical injury  Outcome: Progressing     Problem: Pain  Goal: Verbalizes/displays adequate comfort level or baseline comfort level  Outcome: Progressing

## 2024-11-14 NOTE — PROGRESS NOTES
Pt alert to self, VSS on 2L NC. Pt has not voided this shift; MD notified after bladder scan. This RN encouraging PO intake and ambulation to BSC. Pt being frequently turned Q2 hour this shift to promote skin integrity. Pt tolerating PO diet and fluids. All safety precautions are in place; plan of care to continue.

## 2024-11-14 NOTE — CARE COORDINATION
CM following: ANNAMARIE spoke with Lashawn at [facility] at [facility] who reports they can accept the pt today but will need a few hours notice to arrange the private duty care through [facility]. ANNAMARIE has reached out to the attending to f/u on discharge timing. ANNAMARIE will continue to follow for discharge planning.  Electronically signed by SANTI Benedict on 11/14/2024 at 11:56 AM  [phone]

## 2024-11-14 NOTE — PROGRESS NOTES
[facility] Medicine Progress Note  V 10.25      Date of Admission: 11/8/2024    Hospital Day: 7      Chief Admission Complaint:   fall, witnessed to be mechanical     Subjective:  Patient seen and examined at bedside. No acute events overnight. Patient alert, oriented today. States she feels okay. Still with some upper airway secretions, unable to clear well. Ordered for hypertonic saline neb today.     Presenting Admission History:       Shanice Guillen is a 89 y.o. female with pmh as mentioned above presents from her nursing care facility after a witnessed fall earlier today.  History is limited and obtained from EMS primarily-patient has tripped and fallen which was witnessed by the nursing home staff.  Patient did not strike her head or lose consciousness.  Patient is not on any anticoagulation or antiplatelets.  Patient has reportedly complaining of left hip pain prior to transfer to ED.  Patient denies any pain currently and feels well.  Denies lightheadedness or dizziness prior to the fall    Assessment/Plan:      Current Principal Problem:  Closed avulsion fracture of anterior superior iliac spine of pelvis (HCC)    Plan:    Witnessed mechanical fall  Left hip pain  CT left hip with acute nondisplaced fracture of the anterior superior iliac spine  - Pain relief as ordered  - Social service and case management consults. From assisted living at the [facility]  - Orthopedics evaluated patient, no surgical intervention. Okay for WBAT. PT/OT evaluations, recommending SNF     Hypoxia  CAP, POA  - CTPE done, showed no acute PE. Does show extensive opacification bronchus intermedius and bilateral lower lobe bronchi most compatible with large mucous secretions with associated resorptive atelectasis involving portions of lower lobes. Superimposed pneumonia not excluded  - Started on DVT ppx lovenox  - Ordered acapella, incentive spirometer to help with mucus plugging/atelectasis. Continue to monitor oxygen requirement. Wean  DIET; Dysphagia - Soft and Bite Sized    DVT Prophylaxis: [x]PPx LMWH  []SQ Heparin  []IPC/SCDs  []Eliquis  []Xarelto  []Coumadin  [] Heparin Drip  []Other -      Code status: Limited    PT/OT Eval Status:   []NOT yet ordered  [x]Ordered and Pending   []Seen with Recommendations for:   []Home independently  []Home w/ assist  []HHC  []SNF  []Acute Rehab    Multi-Disciplinary Rounds with Case Management completed on 11/14/2024 with the following recommendations:  Anticipated Discharge Location: []Home w/ []HHC vs []SNF  []Acute Rehab  []LTAC  []Hospice  [x]Other -  tbd  Anticipated Discharge Day/Date:  tbd  Barriers to Discharge: ortho eval  --------------------------------------------------    MDM (any 2 required for High level billing)    A. Problems (any 1)  [] Acute/Chronic Illness/injury posing ongoing threat to life and/or bodily function without ongoing treatment    [] Severe exacerbation of chronic illness    --------------------------------------------------  B. Risk of Treatment (any 1)    [x] Drugs/treatments that require intensive monitoring for toxicity    [x] IV ABX (Vancomycin, Aminoglycosides, etc)     [] Post-Cath/Contrast study requiring serial monitoring    [] IV Narcotic analgesia    [] Aggressive IV diuresis    [] Hypertonic Saline    [] Critical electrolyte abnormalities requiring IV replacement    [] Insulin - Scheduled/SSI or Insulin gtt    [] Anticoagulation (Heparin gtt or Coumadin - other anticoagulants in special circumstances)    [] Cardiac Medications (IV Amiodarone/Diltiazem, Tikosyn, etc)    [] Hemodialysis    [] Other -    [] Change in code status    [] Decision to escalate care    [] Major surgery/procedure with associated risk factors    --------------------------------------------------  C. Data (any 2)    [] Data Review (any 3)    [] Consultant notes from yesterday/today    [x] All available current labs reviewed interpreted for clinical significance    [x] Appropriate follow-up

## 2024-11-14 NOTE — PROGRESS NOTES
Patient is A&Ox1; disoriented to place, situation and time. VSS this shift with exception of elevated bp at baseline and 2L of oxygen.  Patient diet soft and bite sized. Tolerating ambulation x2 stedy. Voiding via purwick. Patient updated on plan of care. Fall and safety precautions in place, call light within reach. Plan of care ongoing.

## 2024-11-14 NOTE — PROGRESS NOTES
Patient is alert and oriented x1. VSS on room air. Medications given per MAR, no side effects noted. Patient ambulating x2 with delicia escobeod. No complaints of pain, continuing to monitor and manage per MAR. Voiding well via external catheter, no bm this shift.      Patient is currently resting in bed with bed alarm on for safety. Call light within reach and all fall precautions in place. Plan of care continues.

## 2024-11-15 LAB
ANION GAP SERPL CALCULATED.3IONS-SCNC: 8 MMOL/L (ref 3–16)
BACTERIA BLD CULT ORG #2: NORMAL
BACTERIA BLD CULT: NORMAL
BASOPHILS # BLD: 0 K/UL (ref 0–0.2)
BASOPHILS NFR BLD: 0.4 %
BUN SERPL-MCNC: 14 MG/DL (ref 7–20)
CALCIUM SERPL-MCNC: 8.9 MG/DL (ref 8.3–10.6)
CHLORIDE SERPL-SCNC: 102 MMOL/L (ref 99–110)
CO2 SERPL-SCNC: 26 MMOL/L (ref 21–32)
CREAT SERPL-MCNC: 0.6 MG/DL (ref 0.6–1.2)
DEPRECATED RDW RBC AUTO: 14.7 % (ref 12.4–15.4)
EOSINOPHIL # BLD: 0.1 K/UL (ref 0–0.6)
EOSINOPHIL NFR BLD: 1 %
GFR SERPLBLD CREATININE-BSD FMLA CKD-EPI: 84 ML/MIN/{1.73_M2}
GLUCOSE SERPL-MCNC: 115 MG/DL (ref 70–99)
HCT VFR BLD AUTO: 32.8 % (ref 36–48)
HGB BLD-MCNC: 11 G/DL (ref 12–16)
LYMPHOCYTES # BLD: 0.8 K/UL (ref 1–5.1)
LYMPHOCYTES NFR BLD: 13.5 %
MCH RBC QN AUTO: 29.2 PG (ref 26–34)
MCHC RBC AUTO-ENTMCNC: 33.6 G/DL (ref 31–36)
MCV RBC AUTO: 86.8 FL (ref 80–100)
MONOCYTES # BLD: 0.8 K/UL (ref 0–1.3)
MONOCYTES NFR BLD: 12.7 %
NEUTROPHILS # BLD: 4.5 K/UL (ref 1.7–7.7)
NEUTROPHILS NFR BLD: 72.4 %
PLATELET # BLD AUTO: 339 K/UL (ref 135–450)
PMV BLD AUTO: 7.5 FL (ref 5–10.5)
POTASSIUM SERPL-SCNC: 3.6 MMOL/L (ref 3.5–5.1)
RBC # BLD AUTO: 3.78 M/UL (ref 4–5.2)
SODIUM SERPL-SCNC: 136 MMOL/L (ref 136–145)
WBC # BLD AUTO: 6.2 K/UL (ref 4–11)

## 2024-11-15 PROCEDURE — 80048 BASIC METABOLIC PNL TOTAL CA: CPT

## 2024-11-15 PROCEDURE — 85025 COMPLETE CBC W/AUTO DIFF WBC: CPT

## 2024-11-15 PROCEDURE — 6370000000 HC RX 637 (ALT 250 FOR IP)

## 2024-11-15 PROCEDURE — 6370000000 HC RX 637 (ALT 250 FOR IP): Performed by: INTERNAL MEDICINE

## 2024-11-15 PROCEDURE — 36415 COLL VENOUS BLD VENIPUNCTURE: CPT

## 2024-11-15 PROCEDURE — 2580000003 HC RX 258

## 2024-11-15 PROCEDURE — 6360000002 HC RX W HCPCS

## 2024-11-15 PROCEDURE — 2700000000 HC OXYGEN THERAPY PER DAY

## 2024-11-15 PROCEDURE — 2500000003 HC RX 250 WO HCPCS

## 2024-11-15 PROCEDURE — 94761 N-INVAS EAR/PLS OXIMETRY MLT: CPT

## 2024-11-15 PROCEDURE — 1200000000 HC SEMI PRIVATE

## 2024-11-15 PROCEDURE — 51701 INSERT BLADDER CATHETER: CPT

## 2024-11-15 PROCEDURE — 94640 AIRWAY INHALATION TREATMENT: CPT

## 2024-11-15 PROCEDURE — 51798 US URINE CAPACITY MEASURE: CPT

## 2024-11-15 PROCEDURE — 2580000003 HC RX 258: Performed by: INTERNAL MEDICINE

## 2024-11-15 RX ORDER — IPRATROPIUM BROMIDE AND ALBUTEROL SULFATE 2.5; .5 MG/3ML; MG/3ML
1 SOLUTION RESPIRATORY (INHALATION)
Status: DISCONTINUED | OUTPATIENT
Start: 2024-11-15 | End: 2024-11-18 | Stop reason: HOSPADM

## 2024-11-15 RX ORDER — LISINOPRIL 10 MG/1
10 TABLET ORAL ONCE
Status: COMPLETED | OUTPATIENT
Start: 2024-11-15 | End: 2024-11-15

## 2024-11-15 RX ORDER — LISINOPRIL 20 MG/1
20 TABLET ORAL DAILY
Status: DISCONTINUED | OUTPATIENT
Start: 2024-11-16 | End: 2024-11-18 | Stop reason: HOSPADM

## 2024-11-15 RX ORDER — SODIUM CHLORIDE 9 MG/ML
INJECTION, SOLUTION INTRAVENOUS CONTINUOUS
Status: ACTIVE | OUTPATIENT
Start: 2024-11-15 | End: 2024-11-15

## 2024-11-15 RX ADMIN — MIRTAZAPINE 15 MG: 15 TABLET, FILM COATED ORAL at 20:07

## 2024-11-15 RX ADMIN — CEFTRIAXONE 1000 MG: 1 INJECTION, POWDER, FOR SOLUTION INTRAMUSCULAR; INTRAVENOUS at 09:03

## 2024-11-15 RX ADMIN — GUAIFENESIN 200 MG: 100 SOLUTION ORAL at 09:00

## 2024-11-15 RX ADMIN — ENOXAPARIN SODIUM 30 MG: 100 INJECTION SUBCUTANEOUS at 21:00

## 2024-11-15 RX ADMIN — LISINOPRIL 10 MG: 10 TABLET ORAL at 16:36

## 2024-11-15 RX ADMIN — IPRATROPIUM BROMIDE AND ALBUTEROL SULFATE 1 DOSE: 2.5; .5 SOLUTION RESPIRATORY (INHALATION) at 08:29

## 2024-11-15 RX ADMIN — LEVOTHYROXINE SODIUM 137 MCG: 0.14 TABLET ORAL at 09:00

## 2024-11-15 RX ADMIN — METOPROLOL TARTRATE 12.5 MG: 25 TABLET, FILM COATED ORAL at 20:07

## 2024-11-15 RX ADMIN — IPRATROPIUM BROMIDE AND ALBUTEROL SULFATE 1 DOSE: 2.5; .5 SOLUTION RESPIRATORY (INHALATION) at 11:19

## 2024-11-15 RX ADMIN — GUAIFENESIN 200 MG: 100 SOLUTION ORAL at 20:07

## 2024-11-15 RX ADMIN — SODIUM CHLORIDE, PRESERVATIVE FREE 10 ML: 5 INJECTION INTRAVENOUS at 09:02

## 2024-11-15 RX ADMIN — IPRATROPIUM BROMIDE AND ALBUTEROL SULFATE 1 DOSE: 2.5; .5 SOLUTION RESPIRATORY (INHALATION) at 15:00

## 2024-11-15 RX ADMIN — METOPROLOL TARTRATE 12.5 MG: 25 TABLET, FILM COATED ORAL at 08:59

## 2024-11-15 RX ADMIN — LISINOPRIL 10 MG: 10 TABLET ORAL at 08:59

## 2024-11-15 RX ADMIN — SODIUM CHLORIDE: 0.9 INJECTION, SOLUTION INTRAVENOUS at 16:35

## 2024-11-15 NOTE — PLAN OF CARE
Problem: Safety - Adult  Goal: Free from fall injury  11/15/2024 0817 by Jackelin Walters, RN  Outcome: Progressing   All fall precautions in place. Bed locked and in lowest position with alarm on. Overbed table and personal belonings within reach. Call light within reach and patient instructed to use call light for assistance. Non-skid socks on.    Problem: Pain  Goal: Verbalizes/displays adequate comfort level or baseline comfort level  11/15/2024 0817 by Jackelin Walters, RN  Outcome: Progressing  No complaints of any pain at this time, continuing to monitor and manage per MAR.

## 2024-11-15 NOTE — PROGRESS NOTES
[facility] Medicine Progress Note  V 10.25      Date of Admission: 11/8/2024    Hospital Day: 8      Chief Admission Complaint:   fall, witnessed to be mechanical     Subjective:  Patient seen and examined at bedside. No acute events overnight. Somewhat somnolent though easily arousable. Reports feeling fine at this time. Denies any SOB, chest pain, fevers, chills.     Presenting Admission History:       Shanice Guillen is a 89 y.o. female with pmh as mentioned above presents from her nursing care facility after a witnessed fall earlier today.  History is limited and obtained from EMS primarily-patient has tripped and fallen which was witnessed by the nursing home staff.  Patient did not strike her head or lose consciousness.  Patient is not on any anticoagulation or antiplatelets.  Patient has reportedly complaining of left hip pain prior to transfer to ED.  Patient denies any pain currently and feels well.  Denies lightheadedness or dizziness prior to the fall    Assessment/Plan:      Current Principal Problem:  Closed avulsion fracture of anterior superior iliac spine of pelvis (HCC)    Plan:    Witnessed mechanical fall  Left hip pain  CT left hip with acute nondisplaced fracture of the anterior superior iliac spine  - Pain relief as ordered  - Social service and case management consults. From assisted living at the [facility]  - Orthopedics evaluated patient, no surgical intervention. Okay for WBAT. PT/OT evaluations, recommending SNF     Hypoxia  CAP, POA  - CTPE done, showed no acute PE. Does show extensive opacification bronchus intermedius and bilateral lower lobe bronchi most compatible with large mucous secretions with associated resorptive atelectasis involving portions of lower lobes. Superimposed pneumonia not excluded  - Started on DVT ppx lovenox  - Ordered acapella, incentive spirometer to help with mucus plugging/atelectasis. Continue to monitor oxygen requirement. Wean as tolerated for O2 sats >90%.  -

## 2024-11-15 NOTE — CARE COORDINATION
CM following: CM spoke with Lashawn at [facility] at [place] and updated that pt is not discharging today. Lashawn is on call staff for the weekend and reports that they will need as much advanced notice as possible to arrange the private duty care through [facility] [phone] at [facility]. CM spoke with pt's daughter, Ketty, and updated on above information. Ketty reminded CM that if the pt discharges with a [facility]uler and has any OP f/u appointments the pt's daughter cannot transport her to appointments - pt would need medical transport arranged through Medicaid. CM will continue to follow for discharge planning.  Electronically signed by SANTI Benedict on 11/15/2024 at 2:44 PM  [phone]

## 2024-11-15 NOTE — RT PROTOCOL NOTE
RT Nebulizer Bronchodilator Protocol Note    There is a bronchodilator order in the chart from a provider indicating to follow the RT Bronchodilator Protocol and there is an “Initiate RT Bronchodilator Protocol” order as well (see protocol at bottom of note).    CXR Findings:  No results found.    The findings from the last RT Protocol Assessment were as follows:  Smoking: Smoker 15 pack years or more  Respiratory Pattern: Dyspnea on exertion or RR 21-25 bpm  Breath Sounds: Inspiratory and expiratory or bilateral wheezing and/or rhonchi  Cough: Weak, non-productive  Indication for Bronchodilator Therapy:    Bronchodilator Assessment Score: 12    Aerosolized bronchodilator medication orders have been revised according to the RT Nebulizer Bronchodilator Protocol below.    Respiratory Therapist to perform RT Therapy Protocol Assessment initially then follow the protocol.  Repeat RT Therapy Protocol Assessment PRN for score 0-3 or on second treatment, BID, and PRN for scores above 3.    No Indications - adjust the frequency to every 6 hours PRN wheezing or bronchospasm, if no treatments needed after 48 hours then discontinue using Per Protocol order mode.     If indication present, adjust the RT bronchodilator orders based on the Bronchodilator Assessment Score as indicated below.  If a patient is on this medication at home then do not decrease Frequency below that used at home.    0-3 - enter or revise RT bronchodilator order(s) to equivalent RT Bronchodilator order with Frequency of every 4 hours PRN for wheezing or increased work of breathing using Per Protocol order mode.       4-6 - enter or revise RT Bronchodilator order(s) to two equivalent RT bronchodilator orders with one order with BID Frequency and one order with Frequency of every 4 hours PRN wheezing or increased work of breathing using Per Protocol order mode.         7-10 - enter or revise RT Bronchodilator order(s) to two equivalent RT bronchodilator

## 2024-11-15 NOTE — PLAN OF CARE
Problem: Safety - Adult  Goal: Free from fall injury  Outcome: Progressing   Pt remains free from injury during this shift. Pt is up with assist x 2 person, gait belt and stedy. Call light is in reach, AVS and bed alarm activated for safety, bed locked and lowest position. Will continue to monitor.    Problem: Pain  Goal: Verbalizes/displays adequate comfort level or baseline comfort level  Outcome: Progressing   No c/o pain during this shift. Will continue to monitor for comfort.

## 2024-11-16 LAB
ANION GAP SERPL CALCULATED.3IONS-SCNC: 15 MMOL/L (ref 3–16)
BASOPHILS # BLD: 0.1 K/UL (ref 0–0.2)
BASOPHILS NFR BLD: 0.6 %
BUN SERPL-MCNC: 17 MG/DL (ref 7–20)
CALCIUM SERPL-MCNC: 9.4 MG/DL (ref 8.3–10.6)
CHLORIDE SERPL-SCNC: 101 MMOL/L (ref 99–110)
CO2 SERPL-SCNC: 21 MMOL/L (ref 21–32)
CREAT SERPL-MCNC: 0.6 MG/DL (ref 0.6–1.2)
DEPRECATED RDW RBC AUTO: 14.9 % (ref 12.4–15.4)
EOSINOPHIL # BLD: 0 K/UL (ref 0–0.6)
EOSINOPHIL NFR BLD: 0.2 %
GFR SERPLBLD CREATININE-BSD FMLA CKD-EPI: 84 ML/MIN/{1.73_M2}
GLUCOSE SERPL-MCNC: 75 MG/DL (ref 70–99)
HCT VFR BLD AUTO: 36 % (ref 36–48)
HGB BLD-MCNC: 11.8 G/DL (ref 12–16)
LYMPHOCYTES # BLD: 0.9 K/UL (ref 1–5.1)
LYMPHOCYTES NFR BLD: 10.8 %
MCH RBC QN AUTO: 28.7 PG (ref 26–34)
MCHC RBC AUTO-ENTMCNC: 32.6 G/DL (ref 31–36)
MCV RBC AUTO: 88.1 FL (ref 80–100)
MONOCYTES # BLD: 0.7 K/UL (ref 0–1.3)
MONOCYTES NFR BLD: 7.4 %
NEUTROPHILS # BLD: 7.1 K/UL (ref 1.7–7.7)
NEUTROPHILS NFR BLD: 81 %
PLATELET # BLD AUTO: 413 K/UL (ref 135–450)
PMV BLD AUTO: 7.7 FL (ref 5–10.5)
POTASSIUM SERPL-SCNC: 3.8 MMOL/L (ref 3.5–5.1)
RBC # BLD AUTO: 4.09 M/UL (ref 4–5.2)
SODIUM SERPL-SCNC: 137 MMOL/L (ref 136–145)
WBC # BLD AUTO: 8.7 K/UL (ref 4–11)

## 2024-11-16 PROCEDURE — 94640 AIRWAY INHALATION TREATMENT: CPT

## 2024-11-16 PROCEDURE — 2700000000 HC OXYGEN THERAPY PER DAY

## 2024-11-16 PROCEDURE — 6360000002 HC RX W HCPCS

## 2024-11-16 PROCEDURE — 1200000000 HC SEMI PRIVATE

## 2024-11-16 PROCEDURE — 2580000003 HC RX 258: Performed by: INTERNAL MEDICINE

## 2024-11-16 PROCEDURE — 6370000000 HC RX 637 (ALT 250 FOR IP)

## 2024-11-16 PROCEDURE — 80048 BASIC METABOLIC PNL TOTAL CA: CPT

## 2024-11-16 PROCEDURE — 2500000003 HC RX 250 WO HCPCS

## 2024-11-16 PROCEDURE — 2580000003 HC RX 258

## 2024-11-16 PROCEDURE — 51798 US URINE CAPACITY MEASURE: CPT

## 2024-11-16 PROCEDURE — 94669 MECHANICAL CHEST WALL OSCILL: CPT

## 2024-11-16 PROCEDURE — 36415 COLL VENOUS BLD VENIPUNCTURE: CPT

## 2024-11-16 PROCEDURE — 85025 COMPLETE CBC W/AUTO DIFF WBC: CPT

## 2024-11-16 PROCEDURE — 6370000000 HC RX 637 (ALT 250 FOR IP): Performed by: INTERNAL MEDICINE

## 2024-11-16 PROCEDURE — 94761 N-INVAS EAR/PLS OXIMETRY MLT: CPT

## 2024-11-16 RX ORDER — SODIUM CHLORIDE 9 MG/ML
INJECTION, SOLUTION INTRAVENOUS CONTINUOUS
Status: ACTIVE | OUTPATIENT
Start: 2024-11-16 | End: 2024-11-16

## 2024-11-16 RX ADMIN — METOPROLOL TARTRATE 12.5 MG: 25 TABLET, FILM COATED ORAL at 20:04

## 2024-11-16 RX ADMIN — METOPROLOL TARTRATE 12.5 MG: 25 TABLET, FILM COATED ORAL at 08:36

## 2024-11-16 RX ADMIN — ENOXAPARIN SODIUM 30 MG: 100 INJECTION SUBCUTANEOUS at 20:04

## 2024-11-16 RX ADMIN — SODIUM CHLORIDE, PRESERVATIVE FREE 10 ML: 5 INJECTION INTRAVENOUS at 08:36

## 2024-11-16 RX ADMIN — OXYCODONE 2.5 MG: 5 TABLET ORAL at 22:35

## 2024-11-16 RX ADMIN — SODIUM CHLORIDE: 9 INJECTION, SOLUTION INTRAVENOUS at 12:48

## 2024-11-16 RX ADMIN — GUAIFENESIN 200 MG: 100 SOLUTION ORAL at 08:36

## 2024-11-16 RX ADMIN — LISINOPRIL 20 MG: 20 TABLET ORAL at 08:36

## 2024-11-16 RX ADMIN — CEFTRIAXONE 1000 MG: 1 INJECTION, POWDER, FOR SOLUTION INTRAMUSCULAR; INTRAVENOUS at 08:36

## 2024-11-16 RX ADMIN — LEVOTHYROXINE SODIUM 137 MCG: 0.14 TABLET ORAL at 06:51

## 2024-11-16 RX ADMIN — MIRTAZAPINE 15 MG: 15 TABLET, FILM COATED ORAL at 20:04

## 2024-11-16 RX ADMIN — IPRATROPIUM BROMIDE AND ALBUTEROL SULFATE 1 DOSE: 2.5; .5 SOLUTION RESPIRATORY (INHALATION) at 09:45

## 2024-11-16 RX ADMIN — IPRATROPIUM BROMIDE AND ALBUTEROL SULFATE 1 DOSE: 2.5; .5 SOLUTION RESPIRATORY (INHALATION) at 15:44

## 2024-11-16 RX ADMIN — IPRATROPIUM BROMIDE AND ALBUTEROL SULFATE 1 DOSE: 2.5; .5 SOLUTION RESPIRATORY (INHALATION) at 11:11

## 2024-11-16 ASSESSMENT — PAIN SCALES - GENERAL: PAINLEVEL_OUTOF10: 7

## 2024-11-16 NOTE — PROGRESS NOTES
Pt bladder scan for 339 mL. Pt received a 500 mL NS bolus that ended at 0000. Pt has not voided since 0930 yesterday when she was straight cathed. This Rn messaged the on call hospitalist asking if I should straight cath. I was given the order to monitor.

## 2024-11-16 NOTE — PLAN OF CARE
Problem: Safety - Adult  Goal: Free from fall injury  Outcome: Progressing  Note: Pt will remain free of falls for the duration of the shift. Pt is A/O x 1 (person) Pt up with the delicia escobedo.      Problem: Skin/Tissue Integrity  Goal: Absence of new skin breakdown  Description: 1.  Monitor for areas of redness and/or skin breakdown  2.  Assess vascular access sites hourly  3.  Every 4-6 hours minimum:  Change oxygen saturation probe site  4.  Every 4-6 hours:  If on nasal continuous positive airway pressure, respiratory therapy assess nares and determine need for appliance change or resting period.  Outcome: Progressing  Note: Pt on an air mattress. Turned Q2 hrs.      Problem: Pain  Goal: Verbalizes/displays adequate comfort level or baseline comfort level  Outcome: Progressing  Note: FLACC pain scale 0     Problem: Respiratory - Adult  Goal: Achieves optimal ventilation and oxygenation  Outcome: Not Progressing  Note: Pt requiring 2.5 L oxygen per NC     Problem: Musculoskeletal - Adult  Goal: Return mobility to safest level of function  Outcome: Not Progressing  Note: Pt max assist. Requires delicia escobedo

## 2024-11-16 NOTE — PROGRESS NOTES
Pt alert, oriented to self. VSS on 1L NC O2. Pt up to BSC this shift with a x1 heavy assist using gait belt, pt tolerated activity. Pt repositioned every 2 hours using wedge support, no new skin breakdown noted this shift. Pt tolerating fluids and foods PO. All fall precautions are in place, call light within pt reach.

## 2024-11-16 NOTE — PROGRESS NOTES
[facility] Medicine Progress Note  V 10.25      Date of Admission: 11/8/2024    Hospital Day: 9      Chief Admission Complaint:   fall, witnessed to be mechanical     Subjective:  Patient seen and examined at bedside. No acute events overnight. Somewhat somnolent though easily arousable. Reports feeling okay this morning. Still unable to void. Denies any acute concerns.     Presenting Admission History:       Shanice Guillen is a 89 y.o. female with pmh as mentioned above presents from her nursing care facility after a witnessed fall earlier today.  History is limited and obtained from EMS primarily-patient has tripped and fallen which was witnessed by the nursing home staff.  Patient did not strike her head or lose consciousness.  Patient is not on any anticoagulation or antiplatelets.  Patient has reportedly complaining of left hip pain prior to transfer to ED.  Patient denies any pain currently and feels well.  Denies lightheadedness or dizziness prior to the fall    Assessment/Plan:      Current Principal Problem:  Closed avulsion fracture of anterior superior iliac spine of pelvis (HCC)    Plan:    Witnessed mechanical fall  Left hip pain  CT left hip with acute nondisplaced fracture of the anterior superior iliac spine  - Pain relief as ordered  - Social service and case management consults. From assisted living at the [facility]  - Orthopedics evaluated patient, no surgical intervention. Okay for WBAT. PT/OT evaluations, recommending SNF     Hypoxia  CAP, POA  - CTPE done, showed no acute PE. Does show extensive opacification bronchus intermedius and bilateral lower lobe bronchi most compatible with large mucous secretions with associated resorptive atelectasis involving portions of lower lobes. Superimposed pneumonia not excluded  - Started on DVT ppx lovenox  - Ordered acapella, incentive spirometer to help with mucus plugging/atelectasis. Continue to monitor oxygen requirement. Wean as tolerated for O2 sats

## 2024-11-16 NOTE — PROGRESS NOTES
Care of patient assumed by this RN at this time. Report received from offgoing RN Jackelin at bedside. Patient resting comfortably with no complaints. All fall precautions in place.

## 2024-11-16 NOTE — PLAN OF CARE
Problem: Safety - Adult  Goal: Free from fall injury  11/16/2024 1603 by Mey Cordova, RN  Outcome: Progressing  Flowsheets (Taken 11/16/2024 1545)  Free From Fall Injury: Instruct family/caregiver on patient safety  All fall precautions in place. Bed locked and in lowest position with alarm on. Overbed table and personal belongings within reach. Call light within reach and patient instructed to use call light for assistance. Non-skid socks on.    Problem: Skin/Tissue Integrity  Goal: Absence of new skin breakdown  Description: 1.  Monitor for areas of redness and/or skin breakdown  2.  Assess vascular access sites hourly  3.  Every 4-6 hours minimum:  Change oxygen saturation probe site  4.  Every 4-6 hours:  If on nasal continuous positive airway pressure, respiratory therapy assess nares and determine need for appliance change or resting period.  11/16/2024 1603 by Mey Cordova, RN  Outcome: Progressing  Pt repositioned with wedge support every 2 hours. Skin kept dry. No new skin breakdown noted this shift.

## 2024-11-16 NOTE — RT PROTOCOL NOTE
RT Inhaler-Nebulizer Bronchodilator Protocol Note    There is a bronchodilator order in the chart from a provider indicating to follow the RT Bronchodilator Protocol and there is an “Initiate RT Inhaler-Nebulizer Bronchodilator Protocol” order as well (see protocol at bottom of note).    CXR Findings:  No results found.    The findings from the last RT Protocol Assessment were as follows:   History Pulmonary Disease: None or smoker <15 pack years  Respiratory Pattern: Regular pattern and RR 12-20 bpm  Breath Sounds: Intermittent or unilateral wheezes  Cough: Strong, productive  Indication for Bronchodilator Therapy:    Bronchodilator Assessment Score: 5  Will continue with QID.    Aerosolized bronchodilator medication orders have been revised according to the RT Inhaler-Nebulizer Bronchodilator Protocol below.    Respiratory Therapist to perform RT Therapy Protocol Assessment initially then follow the protocol.  Repeat RT Therapy Protocol Assessment PRN for score 0-3 or on second treatment, BID, and PRN for scores above 3.    No Indications - adjust the frequency to every 6 hours PRN wheezing or bronchospasm, if no treatments needed after 48 hours then discontinue using Per Protocol order mode.     If indication present, adjust the RT bronchodilator orders based on the Bronchodilator Assessment Score as indicated below.  Use Inhaler orders unless patient has one or more of the following: on home nebulizer, not able to hold breath for 10 seconds, is not alert and oriented, cannot activate and use MDI correctly, or respiratory rate 25 breaths per minute or more, then use the equivalent nebulizer order(s) with same Frequency and PRN reasons based on the score.  If a patient is on this medication at home then do not decrease Frequency below that used at home.    0-3 - enter or revise RT bronchodilator order(s) to equivalent RT Bronchodilator order with Frequency of every 4 hours PRN for wheezing or increased work of

## 2024-11-17 LAB
BASOPHILS # BLD: 0 K/UL (ref 0–0.2)
BASOPHILS NFR BLD: 0.4 %
DEPRECATED RDW RBC AUTO: 14.8 % (ref 12.4–15.4)
EOSINOPHIL # BLD: 0.1 K/UL (ref 0–0.6)
EOSINOPHIL NFR BLD: 2.1 %
HCT VFR BLD AUTO: 34 % (ref 36–48)
HGB BLD-MCNC: 10.9 G/DL (ref 12–16)
LYMPHOCYTES # BLD: 1 K/UL (ref 1–5.1)
LYMPHOCYTES NFR BLD: 16.8 %
MCH RBC QN AUTO: 28.7 PG (ref 26–34)
MCHC RBC AUTO-ENTMCNC: 32 G/DL (ref 31–36)
MCV RBC AUTO: 89.7 FL (ref 80–100)
MONOCYTES # BLD: 0.6 K/UL (ref 0–1.3)
MONOCYTES NFR BLD: 9.9 %
NEUTROPHILS # BLD: 4.2 K/UL (ref 1.7–7.7)
NEUTROPHILS NFR BLD: 70.8 %
PLATELET # BLD AUTO: 308 K/UL (ref 135–450)
PMV BLD AUTO: 8.2 FL (ref 5–10.5)
RBC # BLD AUTO: 3.79 M/UL (ref 4–5.2)
WBC # BLD AUTO: 6 K/UL (ref 4–11)

## 2024-11-17 PROCEDURE — 36415 COLL VENOUS BLD VENIPUNCTURE: CPT

## 2024-11-17 PROCEDURE — 6370000000 HC RX 637 (ALT 250 FOR IP): Performed by: INTERNAL MEDICINE

## 2024-11-17 PROCEDURE — 1200000000 HC SEMI PRIVATE

## 2024-11-17 PROCEDURE — 6370000000 HC RX 637 (ALT 250 FOR IP)

## 2024-11-17 PROCEDURE — 2500000003 HC RX 250 WO HCPCS

## 2024-11-17 PROCEDURE — 94761 N-INVAS EAR/PLS OXIMETRY MLT: CPT

## 2024-11-17 PROCEDURE — 51798 US URINE CAPACITY MEASURE: CPT

## 2024-11-17 PROCEDURE — 94669 MECHANICAL CHEST WALL OSCILL: CPT

## 2024-11-17 PROCEDURE — 94640 AIRWAY INHALATION TREATMENT: CPT

## 2024-11-17 PROCEDURE — 85025 COMPLETE CBC W/AUTO DIFF WBC: CPT

## 2024-11-17 PROCEDURE — 2700000000 HC OXYGEN THERAPY PER DAY

## 2024-11-17 PROCEDURE — 2580000003 HC RX 258: Performed by: INTERNAL MEDICINE

## 2024-11-17 PROCEDURE — 2580000003 HC RX 258

## 2024-11-17 PROCEDURE — 6360000002 HC RX W HCPCS

## 2024-11-17 RX ORDER — OXYCODONE HYDROCHLORIDE 5 MG/1
2.5 TABLET ORAL EVERY 4 HOURS PRN
Qty: 9 TABLET | Refills: 0 | Status: SHIPPED | OUTPATIENT
Start: 2024-11-17 | End: 2024-11-20

## 2024-11-17 RX ORDER — IPRATROPIUM BROMIDE AND ALBUTEROL SULFATE 2.5; .5 MG/3ML; MG/3ML
3 SOLUTION RESPIRATORY (INHALATION) EVERY 4 HOURS PRN
Qty: 720 DEVICE | Refills: 0 | Status: SHIPPED | OUTPATIENT
Start: 2024-11-17

## 2024-11-17 RX ORDER — LISINOPRIL 20 MG/1
20 TABLET ORAL DAILY
Qty: 30 TABLET | Refills: 3 | Status: SHIPPED | OUTPATIENT
Start: 2024-11-18

## 2024-11-17 RX ORDER — GUAIFENESIN 200 MG/10ML
200 LIQUID ORAL 4 TIMES DAILY PRN
Qty: 60 ML | Refills: 0 | Status: SHIPPED | OUTPATIENT
Start: 2024-11-17 | End: 2024-11-20

## 2024-11-17 RX ORDER — METOPROLOL TARTRATE 25 MG/1
12.5 TABLET, FILM COATED ORAL 2 TIMES DAILY
Qty: 60 TABLET | Refills: 3 | Status: SHIPPED | OUTPATIENT
Start: 2024-11-17

## 2024-11-17 RX ADMIN — IPRATROPIUM BROMIDE AND ALBUTEROL SULFATE 1 DOSE: 2.5; .5 SOLUTION RESPIRATORY (INHALATION) at 12:02

## 2024-11-17 RX ADMIN — MIRTAZAPINE 15 MG: 15 TABLET, FILM COATED ORAL at 19:48

## 2024-11-17 RX ADMIN — IPRATROPIUM BROMIDE AND ALBUTEROL SULFATE 1 DOSE: 2.5; .5 SOLUTION RESPIRATORY (INHALATION) at 20:28

## 2024-11-17 RX ADMIN — CEFTRIAXONE 1000 MG: 1 INJECTION, POWDER, FOR SOLUTION INTRAMUSCULAR; INTRAVENOUS at 08:04

## 2024-11-17 RX ADMIN — IPRATROPIUM BROMIDE AND ALBUTEROL SULFATE 1 DOSE: 2.5; .5 SOLUTION RESPIRATORY (INHALATION) at 09:40

## 2024-11-17 RX ADMIN — METOPROLOL TARTRATE 12.5 MG: 25 TABLET, FILM COATED ORAL at 08:04

## 2024-11-17 RX ADMIN — GUAIFENESIN 200 MG: 100 SOLUTION ORAL at 19:48

## 2024-11-17 RX ADMIN — LISINOPRIL 20 MG: 20 TABLET ORAL at 08:04

## 2024-11-17 RX ADMIN — GUAIFENESIN 200 MG: 100 SOLUTION ORAL at 08:09

## 2024-11-17 RX ADMIN — ENOXAPARIN SODIUM 30 MG: 100 INJECTION SUBCUTANEOUS at 19:47

## 2024-11-17 RX ADMIN — IPRATROPIUM BROMIDE AND ALBUTEROL SULFATE 1 DOSE: 2.5; .5 SOLUTION RESPIRATORY (INHALATION) at 15:01

## 2024-11-17 RX ADMIN — METOPROLOL TARTRATE 12.5 MG: 25 TABLET, FILM COATED ORAL at 19:47

## 2024-11-17 RX ADMIN — SODIUM CHLORIDE, PRESERVATIVE FREE 10 ML: 5 INJECTION INTRAVENOUS at 08:04

## 2024-11-17 NOTE — DISCHARGE INSTRUCTIONS
We are discharging you with some changes to your medications. We have increased the dose of your lisinopril to 20mg once per day from 10mg once per day. We have also resumed your metoprolol 12.5mg twice per day.     We are giving you some short course prescriptions for duonebs, to use as needed to help with shortness of breath and your mucous secretion clearance. We are also providing some guaifenesin, to help with your mucous secretions.     Please follow up with your primary care physician within 1-2 weeks following discharge    Please return to the hospital with any new or worsening symptoms.

## 2024-11-17 NOTE — PLAN OF CARE
Problem: Safety - Adult  Goal: Free from fall injury  11/17/2024 0148 by Stephani Mcdonough, RN  Outcome: Progressing  Note: Pt will remain free of falls for the duration of the shift. Pt is A/O x 1 (person) Pt is max assist, stand and pivot to the commode/delicia stedy to the BR. Bed alarm on, wheels locked, bed in lowest position, side rails up 2/4, nonskid socks on, call light and bedside table in reach.      Problem: Skin/Tissue Integrity  Goal: Absence of new skin breakdown  Description: 1.  Monitor for areas of redness and/or skin breakdown  2.  Assess vascular access sites hourly  3.  Every 4-6 hours minimum:  Change oxygen saturation probe site  4.  Every 4-6 hours:  If on nasal continuous positive airway pressure, respiratory therapy assess nares and determine need for appliance change or resting period.  11/17/2024 0148 by Stephani Mcdonough, RN  Outcome: Progressing  Note: Pt on an air mattress. Pt turned Q2 hrs     Problem: Pain  Goal: Verbalizes/displays adequate comfort level or baseline comfort level  11/17/2024 0148 by Stephani Mcdonough, RN  Outcome: Progressing  Note: 2.5 mg of Oxycodone given for FLACC pain of 7.

## 2024-11-17 NOTE — CARE COORDINATION
DISCHARGE PLANNING:    Chart reviewed.    CM following for discharge plan.  CM spoke with Saira from [facility] at [facility].  Patient is set up with [facility] to have private duty aid.  Private duty aid will start Monday evening from 1800 to 0600 for the first 48hrs after discharge.    Plan is for patient to discharge tomorrow.  Saira requested transport be set up between 1430 - 1500.     Report: [phone]  Fax: [phone]    Electronically signed by YOHANA Esparza, RN Case Manager on 11/17/2024 at 1:05 PM

## 2024-11-17 NOTE — PLAN OF CARE
Problem: Safety - Adult  Goal: Free from fall injury  11/17/2024 0724 by Mey Cordova, RN  Outcome: Progressing  All fall precautions in place. Bed locked and in lowest position with alarm on. Overbed table and personal belongings within reach. Call light within reach and patient instructed to use call light for assistance. Non-skid socks on.    Problem: Pain  Goal: Verbalizes/displays adequate comfort level or baseline comfort level  11/17/2024 0724 by Mey Cordova, RN  Outcome: Progressing  Pt endorsing pain to pelvis. Being treated with PRN pain medication, rest, and frequent repositioning with pillow support for comfort and pressure relief. Pt reports some relief from pain with above interventions.

## 2024-11-17 NOTE — DISCHARGE INSTR - COC
Continuity of Care Form    Patient Name: Shanice Guillen   :  1932  MRN:  [id number]    Admit date:  2024  Discharge date:  24    Code Status Order: Limited   Advance Directives:   Advance Care Flowsheet Documentation             Admitting Physician:  Santos Chapman MD  PCP: Analia Dee MD    Discharging Nurse: Annalisa Bustamante RN  Discharging Hospital Unit/Room#: 5527/5527-01  Discharging Unit Phone Number: [phone]    Emergency Contact:   Extended Emergency Contact Information  Primary Emergency Contact: Ketty Goins   [facility]  Home Phone: [phone]  Work Phone: [phone]  Mobile Phone: [phone]  Relation: Child  Secondary Emergency Contact: Alexandria Guillen  Mobile Phone: [phone]  Relation: Niece/Nephew  Preferred language: English   needed? No    Past Surgical History:  Past Surgical History:   Procedure Laterality Date    APPENDECTOMY      CARDIAC SURGERY      HERNIA REPAIR      HYSTERECTOMY (CERVIX STATUS UNKNOWN)      JOINT REPLACEMENT         Immunization History:   Immunization History   Administered Date(s) Administered    COVID-19, MODERNA BLUE border, Primary or Immunocompromised, (age 12y+), IM, 100 mcg/0.5mL 2021, 2021    COVID-19, PFIZER Bivalent, DO NOT Dilute, (age 12y+), IM, 30 mcg/0.3 mL 10/25/2022       Active Problems:  Patient Active Problem List   Diagnosis Code    Sepsis ([facility]) A41.9    Closed avulsion fracture of anterior superior iliac spine of pelvis ([facility]) S32.313A       Isolation/Infection:   Isolation            No Isolation          Patient Infection Status       None to display                     Nurse Assessment:  Last Vital Signs: BP (!) 156/76   Pulse 76   Temp 97.8 °F (36.6 °C) (Oral)   Resp 18   Ht 1.524 m (5')   Wt 48.3 kg (106 lb 7.7 oz)   SpO2 93%   BMI 20.80 kg/m²     Last documented pain score (0-10 scale): Pain Level: 7  Last Weight:   Wt Readings from Last 1 Encounters:  Skilled Nursing Facility for less 30 days.     Update Admission H&P: No change in H&P    PHYSICIAN SIGNATURE:  Electronically signed by John Mark DO on 11/17/24 at 9:42 AM EST

## 2024-11-17 NOTE — PROGRESS NOTES
Bedside report done with Mey. Pt in bed, eyes closed. Bed alarm on, wheels locked, bed in lowest position, side rails up 2/4, nonskid socks on, call light and bedside table in reach.

## 2024-11-17 NOTE — DISCHARGE SUMMARY
V2.0  Discharge Summary    Name:  Shanice Guillen /Age/Sex: 1932 (89 y.o. female)   Admit Date: 2024  Discharge Date: 24    MRN & CSN:  [id number] & [id number] Encounter Date and Time 24 10:23 AM EST    Attending:  John Mark DO Discharging Provider: John Mark DO       Hospital Course:     Brief HPI: Shanice Guillen is a 89 y.o. female who presented from her nursing care facility after a witnessed fall earlier today.  History is limited and obtained from EMS primarily-patient has tripped and fallen which was witnessed by the nursing home staff.  Patient did not strike her head or lose consciousness.  Patient is not on any anticoagulation or antiplatelets.  Patient has reportedly complaining of left hip pain prior to transfer to ED.  Patient denies any pain currently and feels well.  Denies lightheadedness or dizziness prior to the fall    Brief Problem Based Course:     Community Acquired Pneumonia, POA  - CTPE done, showed no acute PE. Does show extensive opacification bronchus intermedius and bilateral lower lobe bronchi most compatible with large mucous secretions with associated resorptive atelectasis involving portions of lower lobes. Superimposed pneumonia not excluded  - Ordered acapella, incentive spirometer to help with mucus plugging/atelectasis.   - Patient did have fever while in hospital and blood pressure did drop, requiring IV fluids. Given fever, hypotension, and oxygen requirement, concern was for community acquired pneumonia and patient was started on antibiotics with IV Rocephin and azithromycin  - Added scheduled duonebs, scheduled guaifenesin to help with mucous clearance. Procal initially elevated to 9. Repeat procal showed decrease to 2.87  - Patient finished IV antibiotic course for pneumonia, oxygen requirement down to 0.5L on date of discharge. Blood pressure improved. Patient reports breathing feels okay on discharge, does still have some upper airway

## 2024-11-17 NOTE — RT PROTOCOL NOTE
RT Inhaler-Nebulizer Bronchodilator Protocol Note    There is a bronchodilator order in the chart from a provider indicating to follow the RT Bronchodilator Protocol and there is an “Initiate RT Inhaler-Nebulizer Bronchodilator Protocol” order as well (see protocol at bottom of note).    CXR Findings:  No results found.    The findings from the last RT Protocol Assessment were as follows:   History Pulmonary Disease: Chronic pulmonary disease  Respiratory Pattern: Regular pattern and RR 12-20 bpm  Breath Sounds: Inspiratory and expiratory or bilateral wheezing and/or rhonchi  Cough: Strong, productive  Indication for Bronchodilator Therapy:    Bronchodilator Assessment Score: 9  Will continuewith QID.    Aerosolized bronchodilator medication orders have been revised according to the RT Inhaler-Nebulizer Bronchodilator Protocol below.    Respiratory Therapist to perform RT Therapy Protocol Assessment initially then follow the protocol.  Repeat RT Therapy Protocol Assessment PRN for score 0-3 or on second treatment, BID, and PRN for scores above 3.    No Indications - adjust the frequency to every 6 hours PRN wheezing or bronchospasm, if no treatments needed after 48 hours then discontinue using Per Protocol order mode.     If indication present, adjust the RT bronchodilator orders based on the Bronchodilator Assessment Score as indicated below.  Use Inhaler orders unless patient has one or more of the following: on home nebulizer, not able to hold breath for 10 seconds, is not alert and oriented, cannot activate and use MDI correctly, or respiratory rate 25 breaths per minute or more, then use the equivalent nebulizer order(s) with same Frequency and PRN reasons based on the score.  If a patient is on this medication at home then do not decrease Frequency below that used at home.    0-3 - enter or revise RT bronchodilator order(s) to equivalent RT Bronchodilator order with Frequency of every 4 hours PRN for wheezing  or increased work of breathing using Per Protocol order mode.        4-6 - enter or revise RT Bronchodilator order(s) to two equivalent RT bronchodilator orders with one order with BID Frequency and one order with Frequency of every 4 hours PRN wheezing or increased work of breathing using Per Protocol order mode.        7-10 - enter or revise RT Bronchodilator order(s) to two equivalent RT bronchodilator orders with one order with TID Frequency and one order with Frequency of every 4 hours PRN wheezing or increased work of breathing using Per Protocol order mode.       11-13 - enter or revise RT Bronchodilator order(s) to one equivalent RT bronchodilator order with QID Frequency and an Albuterol order with Frequency of every 4 hours PRN wheezing or increased work of breathing using Per Protocol order mode.      Greater than 13 - enter or revise RT Bronchodilator order(s) to one equivalent RT bronchodilator order with every 4 hours Frequency and an Albuterol order with Frequency of every 2 hours PRN wheezing or increased work of breathing using Per Protocol order mode.     RT to enter RT Home Evaluation for COPD & MDI Assessment order using Per Protocol order mode.    Electronically signed by RIMMA ESPANA RCP on 11/17/2024 at 12:04 PM

## 2024-11-17 NOTE — PROGRESS NOTES
Pt alert, oriented to self. VSS on 1L NC O2. Pt up to BSC this shift with a x1 heavy assist using gait belt, pt tolerated activity. Pt repositioned every 2 hours using wedge support, no new skin breakdown noted this shift. Pt had BM this shift. Pt tolerating fluids and foods PO. All fall precautions are in place, call light within pt reach.

## 2024-11-18 VITALS
HEIGHT: 60 IN | DIASTOLIC BLOOD PRESSURE: 68 MMHG | OXYGEN SATURATION: 92 % | RESPIRATION RATE: 16 BRPM | SYSTOLIC BLOOD PRESSURE: 122 MMHG | TEMPERATURE: 98.1 F | HEART RATE: 71 BPM | WEIGHT: 106.48 LBS | BODY MASS INDEX: 20.91 KG/M2

## 2024-11-18 LAB
BASOPHILS # BLD: 0 K/UL (ref 0–0.2)
BASOPHILS NFR BLD: 0.5 %
DEPRECATED RDW RBC AUTO: 14.4 % (ref 12.4–15.4)
EOSINOPHIL # BLD: 0.1 K/UL (ref 0–0.6)
EOSINOPHIL NFR BLD: 1.2 %
HCT VFR BLD AUTO: 32.1 % (ref 36–48)
HGB BLD-MCNC: 10.6 G/DL (ref 12–16)
LYMPHOCYTES # BLD: 0.9 K/UL (ref 1–5.1)
LYMPHOCYTES NFR BLD: 14.5 %
MCH RBC QN AUTO: 28.8 PG (ref 26–34)
MCHC RBC AUTO-ENTMCNC: 33.1 G/DL (ref 31–36)
MCV RBC AUTO: 87.1 FL (ref 80–100)
MONOCYTES # BLD: 0.5 K/UL (ref 0–1.3)
MONOCYTES NFR BLD: 8.2 %
NEUTROPHILS # BLD: 4.9 K/UL (ref 1.7–7.7)
NEUTROPHILS NFR BLD: 75.6 %
PLATELET # BLD AUTO: 450 K/UL (ref 135–450)
PMV BLD AUTO: 7.5 FL (ref 5–10.5)
RBC # BLD AUTO: 3.69 M/UL (ref 4–5.2)
WBC # BLD AUTO: 6.5 K/UL (ref 4–11)

## 2024-11-18 PROCEDURE — 97530 THERAPEUTIC ACTIVITIES: CPT

## 2024-11-18 PROCEDURE — 97110 THERAPEUTIC EXERCISES: CPT

## 2024-11-18 PROCEDURE — 36415 COLL VENOUS BLD VENIPUNCTURE: CPT

## 2024-11-18 PROCEDURE — 2700000000 HC OXYGEN THERAPY PER DAY

## 2024-11-18 PROCEDURE — 85025 COMPLETE CBC W/AUTO DIFF WBC: CPT

## 2024-11-18 PROCEDURE — 94669 MECHANICAL CHEST WALL OSCILL: CPT

## 2024-11-18 PROCEDURE — 6370000000 HC RX 637 (ALT 250 FOR IP): Performed by: INTERNAL MEDICINE

## 2024-11-18 PROCEDURE — 97116 GAIT TRAINING THERAPY: CPT

## 2024-11-18 PROCEDURE — 94640 AIRWAY INHALATION TREATMENT: CPT

## 2024-11-18 PROCEDURE — 2500000003 HC RX 250 WO HCPCS

## 2024-11-18 PROCEDURE — 6370000000 HC RX 637 (ALT 250 FOR IP)

## 2024-11-18 PROCEDURE — 94761 N-INVAS EAR/PLS OXIMETRY MLT: CPT

## 2024-11-18 PROCEDURE — 97535 SELF CARE MNGMENT TRAINING: CPT

## 2024-11-18 PROCEDURE — 2580000003 HC RX 258: Performed by: INTERNAL MEDICINE

## 2024-11-18 RX ADMIN — SODIUM CHLORIDE, PRESERVATIVE FREE 10 ML: 5 INJECTION INTRAVENOUS at 09:47

## 2024-11-18 RX ADMIN — LEVOTHYROXINE SODIUM 137 MCG: 0.14 TABLET ORAL at 06:49

## 2024-11-18 RX ADMIN — IPRATROPIUM BROMIDE AND ALBUTEROL SULFATE 1 DOSE: 2.5; .5 SOLUTION RESPIRATORY (INHALATION) at 07:48

## 2024-11-18 RX ADMIN — GUAIFENESIN 200 MG: 100 SOLUTION ORAL at 09:10

## 2024-11-18 RX ADMIN — LISINOPRIL 20 MG: 20 TABLET ORAL at 09:09

## 2024-11-18 RX ADMIN — IPRATROPIUM BROMIDE AND ALBUTEROL SULFATE 1 DOSE: 2.5; .5 SOLUTION RESPIRATORY (INHALATION) at 13:24

## 2024-11-18 RX ADMIN — METOPROLOL TARTRATE 12.5 MG: 25 TABLET, FILM COATED ORAL at 09:09

## 2024-11-18 RX ADMIN — ACETAMINOPHEN 650 MG: 325 TABLET ORAL at 09:45

## 2024-11-18 ASSESSMENT — PAIN DESCRIPTION - LOCATION: LOCATION: OTHER (COMMENT)

## 2024-11-18 ASSESSMENT — PAIN DESCRIPTION - PAIN TYPE: TYPE: ACUTE PAIN

## 2024-11-18 ASSESSMENT — PAIN - FUNCTIONAL ASSESSMENT: PAIN_FUNCTIONAL_ASSESSMENT: ACTIVITIES ARE NOT PREVENTED

## 2024-11-18 ASSESSMENT — PAIN DESCRIPTION - DESCRIPTORS: DESCRIPTORS: ACHING;THROBBING

## 2024-11-18 ASSESSMENT — PAIN DESCRIPTION - ONSET: ONSET: ON-GOING

## 2024-11-18 ASSESSMENT — PAIN DESCRIPTION - FREQUENCY: FREQUENCY: INTERMITTENT

## 2024-11-18 ASSESSMENT — PAIN SCALES - GENERAL
PAINLEVEL_OUTOF10: 3
PAINLEVEL_OUTOF10: 0

## 2024-11-18 ASSESSMENT — PAIN DESCRIPTION - ORIENTATION: ORIENTATION: MID

## 2024-11-18 NOTE — PROGRESS NOTES
Hospital Medicine Progress Note  V 10.25      Date of Admission: 11/8/2024    Hospital Day: 11      Chief Admission Complaint:   fall, witnessed to be mechanical     Subjective:  Patient seen and examined at bedside. No acute events overnight. Somewhat somnolent though easily arousable. Following commands. States breathing feels okay. Denies any acute concerns.     Presenting Admission History:       Shanice Guillen is a 89 y.o. female with pmh as mentioned above presents from her nursing care facility after a witnessed fall earlier today.  History is limited and obtained from EMS primarily-patient has tripped and fallen which was witnessed by the nursing home staff.  Patient did not strike her head or lose consciousness.  Patient is not on any anticoagulation or antiplatelets.  Patient has reportedly complaining of left hip pain prior to transfer to ED.  Patient denies any pain currently and feels well.  Denies lightheadedness or dizziness prior to the fall    Assessment/Plan:      Current Principal Problem:  Closed avulsion fracture of anterior superior iliac spine of pelvis (HCC)    Plan:    Witnessed mechanical fall  Left hip pain  CT left hip with acute nondisplaced fracture of the anterior superior iliac spine  - Pain relief as ordered  - Social service and case management consults. From assisted living at the [facility]  - Orthopedics evaluated patient, no surgical intervention. Okay for WBAT. PT/OT evaluations, recommending SNF     Hypoxia  CAP, POA  - CTPE done, showed no acute PE. Does show extensive opacification bronchus intermedius and bilateral lower lobe bronchi most compatible with large mucous secretions with associated resorptive atelectasis involving portions of lower lobes. Superimposed pneumonia not excluded  - Started on DVT ppx lovenox  - Ordered acapella, incentive spirometer to help with mucus plugging/atelectasis. Continue to monitor oxygen requirement. Wean as tolerated for O2 sats >90%.  -

## 2024-11-18 NOTE — PLAN OF CARE
Problem: Safety - Adult  Goal: Free from fall injury  11/18/2024 0749 by Annalisa Bustamante, RN  Outcome: Progressing  All fall precautions in place. Bed locked and in lowest position with alarm on. Overbed table and personal belonings within reach. Call light within reach and patient instructed to use call light for assistance. Non-skid socks on.      Problem: Skin/Tissue Integrity  Goal: Absence of new skin breakdown  Description: 1.  Monitor for areas of redness and/or skin breakdown  2.  Assess vascular access sites hourly  3.  Every 4-6 hours minimum:  Change oxygen saturation probe site  4.  Every 4-6 hours:  If on nasal continuous positive airway pressure, respiratory therapy assess nares and determine need for appliance change or resting period.  Outcome: Progressing   Pt being turned Q2 with pillow support to prevent skin breakdown.

## 2024-11-18 NOTE — PROGRESS NOTES
Bedside report done with Mey. Pt in bed, repositioned. No needs expressed. Bed alarm on, wheels locked, bed in lowest position, side rails up 2/4, nonskid socks on, call light and bedside table in reach.

## 2024-11-18 NOTE — RT PROTOCOL NOTE
RT Nebulizer Bronchodilator Protocol Note    There is a bronchodilator order in the chart from a provider indicating to follow the RT Bronchodilator Protocol and there is an “Initiate RT Bronchodilator Protocol” order as well (see protocol at bottom of note).    CXR Findings:  No results found.    The findings from the last RT Protocol Assessment were as follows:  Smoking: Chronic pulmonary disease  Respiratory Pattern: Regular pattern and RR 12-20 bpm  Breath Sounds: Inspiratory and expiratory or bilateral wheezing and/or rhonchi  Cough: Strong, productive  Indication for Bronchodilator Therapy:    Bronchodilator Assessment Score: 9    Aerosolized bronchodilator medication orders have been revised according to the RT Nebulizer Bronchodilator Protocol below.    Respiratory Therapist to perform RT Therapy Protocol Assessment initially then follow the protocol.  Repeat RT Therapy Protocol Assessment PRN for score 0-3 or on second treatment, BID, and PRN for scores above 3.    No Indications - adjust the frequency to every 6 hours PRN wheezing or bronchospasm, if no treatments needed after 48 hours then discontinue using Per Protocol order mode.     If indication present, adjust the RT bronchodilator orders based on the Bronchodilator Assessment Score as indicated below.  If a patient is on this medication at home then do not decrease Frequency below that used at home.    0-3 - enter or revise RT bronchodilator order(s) to equivalent RT Bronchodilator order with Frequency of every 4 hours PRN for wheezing or increased work of breathing using Per Protocol order mode.       4-6 - enter or revise RT Bronchodilator order(s) to two equivalent RT bronchodilator orders with one order with BID Frequency and one order with Frequency of every 4 hours PRN wheezing or increased work of breathing using Per Protocol order mode.         7-10 - enter or revise RT Bronchodilator order(s) to two equivalent RT bronchodilator orders with

## 2024-11-18 NOTE — PLAN OF CARE
Problem: Discharge Planning  Goal: Discharge to home or other facility with appropriate resources  Outcome: Progressing  Note: Pt to d/c to snf today     Problem: Safety - Adult  Goal: Free from fall injury  Outcome: Progressing  Note: Pt will remain free of falls for the duration of the shift. Pt is A/O x1. Pt heavy stand and pivot to the BSC. Bed alarm on, wheels locked, bed in lowest position, side rails up 2/4, nonskid socks on, call light and bedside table in reach.     Problem: Pain  Goal: Verbalizes/displays adequate comfort level or baseline comfort level  Outcome: Progressing  Note: FLACC pain scale 0 this shift.

## 2024-11-18 NOTE — DISCHARGE INSTR - DIET

## 2024-11-18 NOTE — PROGRESS NOTES
Occupational Therapy  Facility/Department: [facility] 5T ORTHO/NEURO  Occupational Therapy Treatment    Name: Shanice Guillen  : 1932  MRN: [id number]  Date of Service: 2024    Discharge Recommendations:  Subacute/Skilled Nursing Facility  OT Equipment Recommendations  Other: Defer to next level of care       Patient Diagnosis(es): The primary encounter diagnosis was Closed fracture of left anterior superior iliac spine, initial encounter ([facility]). A diagnosis of Hypoxemia was also pertinent to this visit.  Past Medical History:  has a past medical history of CAD (coronary artery disease), Diabetes mellitus ([facility]), Generalized weakness, Hyperlipidemia, Memory loss, and Unspecified cerebral artery occlusion with cerebral infarction.  Past Surgical History:  has a past surgical history that includes Hysterectomy; joint replacement; Appendectomy; hernia repair; and Cardiac surgery.    Treatment Diagnosis: Impaired ADL and functional mobility      Assessment  Performance deficits / Impairments: Decreased safe awareness;Decreased balance;Decreased functional mobility ;Decreased cognition;Decreased ADL status;Decreased endurance;Decreased strength  Assessment: Pt very drowsy during session and req encouragement for activity.  Pt req mod assist of 2 bed to chair. Max assist fro grooming/hygiene.  Cont to recomment SNF.  Cont OT tx per plan of care.  Treatment Diagnosis: Impaired ADL and functional mobility  REQUIRES OT FOLLOW-UP: Yes  Activity Tolerance  Activity Tolerance: Patient limited by fatigue;Treatment limited secondary to decreased cognition     Plan  Occupational Therapy Plan  Times Per Week: 2-5x  Current Treatment Recommendations: Strengthening, Functional mobility training, Balance training, Safety education & training, Self-Care / ADL, Equipment evaluation, education, & procurement, Patient/Caregiver education & training, Cognitive/Perceptual training, Cognitive reorientation    Restrictions  Position  for Short Term Goals: DC  Short Term Goal 1: stance tolerance x5 mins for ADLs with CGA  Short Term Goal 2: BSC vs chair t/f with Heena with LRAD PRN  Short Term Goal 3: LB dressing modA      Therapy Time   Individual Concurrent Group Co-treatment   Time In 0930         Time Out 1009         Minutes 39         Timed Code Treatment Minutes: 39 Minutes   Total Treatment Time:39    Ann-Marie Garcia OTR/L [id number]

## 2024-11-18 NOTE — PROGRESS NOTES
Pt is A/Ox2, VSS on 2L of O2, x2 walker/gb. Pt is voiding via bathroom privileges and external catheter, tolerating food and fluids, and pain is being controlled with PRN tylenol. Pt being turned with pillow support to prevent skin breakdown. All safety precautions in place, call light within reach, plan of care continues.

## 2024-11-18 NOTE — CARE COORDINATION
Case Management Assessment            Discharge Note                    Date / Time of Note: 11/18/2024 10:14 AM                  Discharge Note Completed by: SANTI Benedict    Patient Name: Shanice Guillen   YOB: 1932  Diagnosis: Hypoxemia [R09.02]  Closed avulsion fracture of anterior superior iliac spine of pelvis (HCC) [S32.313A]  Closed fracture of left anterior superior iliac spine, initial encounter ([facility]) [S32.392A]   Date / Time: 11/8/2024 11:46 AM    Current PCP: Analia Dee MD  Clinic patient: No    Hospitalization in the last 30 days: No       Advance Directives:  Code Status: Limited  [place] DNR form completed and on chart: Yes    Financial:  Payor: MEDICARE / Plan: MEDICARE PART A AND B / Product Type: *No Product type* /      Pharmacy:    [facility] - [place], [place] - [address] - P [phone] - F [phone]  [address] [address]  Phone: [phone] Fax: [phone]    [facility]/pharmacy #[id number] - [place], [place] - [address] - P [phone] - F [phone]  [address] [facility] [address]  Phone: [phone] Fax: [phone]      Assistance purchasing medications?: Potential Assistance Purchasing Medications: No  Assistance provided by Case Management: None at this time    Does patient want to participate in local refill/ meds to beds program?: No    Meds To Beds General Rules:  1. Can ONLY be done Monday- Friday between 8:30am-5pm  2. Prescription(s) must be in pharmacy by 3pm to be filled same day  3.Copy of patient's insurance/ prescription drug card and patient face sheet must be sent along with the prescription(s)  4. Cost of Rx cannot be added to hospital bill. If financial assistance is needed, please contact unit  or ;  or  CANNOT provide pharmacy voucher for patients co-pays  5. Patients can then  the prescription on their way out of the

## 2024-11-18 NOTE — PROGRESS NOTES
Physical Therapy  Facility/Department: [facility] 5T ORTHO/NEURO  Physical Therapy Treatment    Name: Shanice Guillen  : 1932  MRN: [id number]  Date of Service: 2024    Discharge Recommendations:  Subacute/Skilled Nursing Facility   PT Equipment Recommendations  Other: defer      Patient Diagnosis(es): The primary encounter diagnosis was Closed fracture of left anterior superior iliac spine, initial encounter ([facility]). A diagnosis of Hypoxemia was also pertinent to this visit.  Past Medical History:  has a past medical history of CAD (coronary artery disease), Diabetes mellitus ([facility]), Generalized weakness, Hyperlipidemia, Memory loss, and Unspecified cerebral artery occlusion with cerebral infarction.  Past Surgical History:  has a past surgical history that includes Hysterectomy; joint replacement; Appendectomy; hernia repair; and Cardiac surgery.    Assessment  Body Structures, Functions, Activity Limitations Requiring Skilled Therapeutic Intervention: Decreased functional mobility ;Decreased endurance;Decreased cognition;Decreased balance;Decreased strength;Decreased safe awareness  Assessment: Pt demos decline in functional mobility since initial evaluation.  Now requires assist x 2 for bed mobility, transfers, and limited amb.  Limited primarily by lethargy and weakness but also by pain.  Improved alertness by end of session.  Difficult to obtain PLOF 2* poor cognition.  Pt is not safe to mobilize on her own.  Rec SNF.  Treatment Diagnosis: impaired functional mobility  Therapy Prognosis: Fair  Requires PT Follow-Up: Yes    Plan  Physical Therapy Plan  General Plan:  (2-5)  Current Treatment Recommendations: Strengthening, Balance training, Transfer training, Functional mobility training, Endurance training, Patient/Caregiver education & training, Safety education & training, Home exercise program, Gait training, Therapeutic activities  Safety Devices  Type of Devices: Chair alarm in place, Gait belt, Left in

## 2024-11-26 NOTE — PROGRESS NOTES
Physician Progress Note      PATIENT:               TATUM SUÁREZ  CSN #:                  [id number]  :                       1932  ADMIT DATE:       2024 11:46 AM  DISCH DATE:        2024 2:47 PM  RESPONDING  PROVIDER #:        John Mark DO          QUERY TEXT:    Patient admitted with fall/fracture. Documentation reflects \"concern for   infection/sepsis\" on progress note .  If possible, please document in the   progress notes and discharge summary if sepsis was:    The medical record reflects the following:  Risk Factors: fall, nondisplaced iliac spine fracture,    Clinical Indicators: on arrival to ED - Temp 97.8, WBC 5.8  on 11/10 at 1945 per VS flow sheet- Temp to 102.8, , SBP 62-80  on  Labs: WBC 7.3, Procalcitonin 9.69  On attending progress note -\" Patient febrile overnight to 102.8. Blood   pressure dropped as well. Concern was for infection/sepsis....Given persistent   hypoxia and now fever, low blood pressures, will treat as presumptive CAP,   POA.\"  CXR -Bibasilar atelectasis similar to recent CT.  BC- No growth    Treatment: Tylenol, IV Azithromycin/Rocephin started , 500 ml NS bolus on    followed by 100 ml/hr cont infusion, labs, CXR, blood cx  Options provided:  -- Sepsis not present on arrival confirmed after study  -- Sepsis ruled out after study  -- Other - I will add my own diagnosis  -- Disagree - Not applicable / Not valid  -- Disagree - Clinically unable to determine / Unknown  -- Refer to Clinical Documentation Reviewer    PROVIDER RESPONSE TEXT:    Sepsis not present on arrival confirmed after study    Query created by: Stephani Boateng on 2024 11:21 AM      Electronically signed by:  John Mark DO 2024 6:10 PM

## 2025-04-09 ENCOUNTER — APPOINTMENT (OUTPATIENT)
Dept: GENERAL RADIOLOGY | Age: 89
End: 2025-04-09
Payer: COMMERCIAL

## 2025-04-09 ENCOUNTER — HOSPITAL ENCOUNTER (EMERGENCY)
Age: 89
Discharge: NURSING, REHAB OR LONG-TERM CARE | End: 2025-04-09
Attending: EMERGENCY MEDICINE
Payer: COMMERCIAL

## 2025-04-09 ENCOUNTER — APPOINTMENT (OUTPATIENT)
Dept: CT IMAGING | Age: 89
End: 2025-04-09
Payer: COMMERCIAL

## 2025-04-09 VITALS
RESPIRATION RATE: 20 BRPM | WEIGHT: 94.5 LBS | BODY MASS INDEX: 17.84 KG/M2 | OXYGEN SATURATION: 99 % | DIASTOLIC BLOOD PRESSURE: 76 MMHG | TEMPERATURE: 98.5 F | HEART RATE: 92 BPM | SYSTOLIC BLOOD PRESSURE: 158 MMHG | HEIGHT: 61 IN

## 2025-04-09 DIAGNOSIS — S09.90XA HEAD INJURY, INITIAL ENCOUNTER: Primary | ICD-10-CM

## 2025-04-09 PROCEDURE — 70450 CT HEAD/BRAIN W/O DYE: CPT

## 2025-04-09 PROCEDURE — 72125 CT NECK SPINE W/O DYE: CPT

## 2025-04-09 PROCEDURE — 73030 X-RAY EXAM OF SHOULDER: CPT

## 2025-04-09 PROCEDURE — 12011 RPR F/E/E/N/L/M 2.5 CM/<: CPT

## 2025-04-09 PROCEDURE — 73080 X-RAY EXAM OF ELBOW: CPT

## 2025-04-09 PROCEDURE — 99284 EMERGENCY DEPT VISIT MOD MDM: CPT

## 2025-04-09 ASSESSMENT — PAIN - FUNCTIONAL ASSESSMENT: PAIN_FUNCTIONAL_ASSESSMENT: NONE - DENIES PAIN

## 2025-04-09 NOTE — ED PROVIDER NOTES
THE [facility]  EMERGENCY DEPARTMENT ENCOUNTER          ATTENDING PHYSICIAN NOTE       Date of evaluation: 4/9/2025    Chief Complaint     Fall (Pt found down this morning at her nursing home. Unknown how long the pt was on the ground. Hx of dementia. Pt presents with laceration above right eye. Per EMS, pt is at her baseline mentally.)      History of Present Illness     Shanice Guillen is a 89 y.o. female who presents to the department after being found down on the floor at her nursing facility.  Patient has a history of dementia so cannot tell me the circumstances of her fall.  It is unclear as to how long she was on the ground.  Per EMS, nursing home staff noted the patient is acting at her mental status baseline.    ASSESSMENT / PLAN  (MEDICAL DECISION MAKING)     INITIAL VITALS: BP: (!) 158/76, Temp: 98.5 °F (36.9 °C), Pulse: 92, Respirations: 20, SpO2: 99 %      Shanice Guillen is a 89 y.o. female who presents from nursing facility after being found down on the ground.  It is unclear as to the circumstances of the fall or how long the patient has been on the ground since she is unable to to provide history.  On arrival, patient is hemodynamically stable.  She does have a hematoma and wound present to the right supraorbital rim and complains of tenderness to palpation to the right elbow and left shoulder.  X-rays of the elbow and shoulder show no acute traumatic abnormalities.  CT scan of the head and cervical spine are pending.  Patient's wound was repaired using glue.  Patient will be turned over to the oncoming provider who complete the patient's workup and disposition pending results of testing as well as trial of ambulation.    Is this patient to be included in the SEP-1 core measure? No Exclusion criteria - the patient is NOT to be included for SEP-1 Core Measure due to: Infection is not suspected    Medical Decision Making  Amount and/or Complexity of Data Reviewed  Radiology: ordered.      Clinical 
or evidence of    a dislocation.      IMPRESSION:      No acute fracture or dislocation or bony defect. Demineralization glenohumeral arthritic change noted      Electronically signed by Jack Dolan DO          LABS:   No results found for this visit on 04/09/25.    MOST RECENT VITALS:  BP: (!) 158/76, Temp: 98.5 °F (36.9 °C), Pulse: 92, Respirations: 20, SpO2: 99 %     Procedures         ED Course          The patient was given the following medications:  No orders of the defined types were placed in this encounter.      CONSULTS:  None       Price Ulloa MD  04/09/25 [id number]

## 2025-05-31 ENCOUNTER — HOSPITAL ENCOUNTER (EMERGENCY)
Age: 89
Discharge: HOME | End: 2025-05-31
Attending: STUDENT IN AN ORGANIZED HEALTH CARE EDUCATION/TRAINING PROGRAM
Payer: COMMERCIAL

## 2025-05-31 ENCOUNTER — APPOINTMENT (OUTPATIENT)
Dept: GENERAL RADIOLOGY | Age: 89
End: 2025-05-31
Payer: COMMERCIAL

## 2025-05-31 ENCOUNTER — APPOINTMENT (OUTPATIENT)
Dept: CT IMAGING | Age: 89
End: 2025-05-31
Payer: COMMERCIAL

## 2025-05-31 VITALS
TEMPERATURE: 98.4 F | BODY MASS INDEX: 17.93 KG/M2 | DIASTOLIC BLOOD PRESSURE: 58 MMHG | RESPIRATION RATE: 17 BRPM | WEIGHT: 94.9 LBS | SYSTOLIC BLOOD PRESSURE: 147 MMHG | OXYGEN SATURATION: 97 % | HEART RATE: 81 BPM

## 2025-05-31 DIAGNOSIS — R41.82 ALTERED MENTAL STATUS, UNSPECIFIED ALTERED MENTAL STATUS TYPE: Primary | ICD-10-CM

## 2025-05-31 LAB
ALBUMIN SERPL-MCNC: 3.7 G/DL (ref 3.4–5)
ALBUMIN/GLOB SERPL: 1.2 {RATIO} (ref 1.1–2.2)
ALP SERPL-CCNC: 111 U/L (ref 40–129)
ALT SERPL-CCNC: 12 U/L (ref 10–40)
ANION GAP SERPL CALCULATED.3IONS-SCNC: 9 MMOL/L (ref 3–16)
AST SERPL-CCNC: 23 U/L (ref 15–37)
BASOPHILS # BLD: 0 K/UL (ref 0–0.2)
BASOPHILS NFR BLD: 0.8 %
BILIRUB SERPL-MCNC: <0.2 MG/DL (ref 0–1)
BILIRUB UR QL STRIP.AUTO: NEGATIVE
BUN SERPL-MCNC: 24 MG/DL (ref 7–20)
CALCIUM SERPL-MCNC: 10 MG/DL (ref 8.3–10.6)
CHLORIDE SERPL-SCNC: 104 MMOL/L (ref 99–110)
CLARITY UR: CLEAR
CO2 SERPL-SCNC: 28 MMOL/L (ref 21–32)
COLOR UR: YELLOW
CREAT SERPL-MCNC: 0.9 MG/DL (ref 0.6–1.2)
DEPRECATED RDW RBC AUTO: 14 % (ref 12.4–15.4)
EOSINOPHIL # BLD: 0.1 K/UL (ref 0–0.6)
EOSINOPHIL NFR BLD: 2.3 %
GFR SERPLBLD CREATININE-BSD FMLA CKD-EPI: 59 ML/MIN/{1.73_M2}
GLUCOSE BLD-MCNC: 96 MG/DL
GLUCOSE BLD-MCNC: 96 MG/DL (ref 70–99)
GLUCOSE SERPL-MCNC: 116 MG/DL (ref 70–99)
GLUCOSE UR STRIP.AUTO-MCNC: NEGATIVE MG/DL
HCT VFR BLD AUTO: 37 % (ref 36–48)
HGB BLD-MCNC: 12.7 G/DL (ref 12–16)
HGB UR QL STRIP.AUTO: NEGATIVE
KETONES UR STRIP.AUTO-MCNC: NEGATIVE MG/DL
LACTATE BLDV-SCNC: 1.2 MMOL/L (ref 0.4–1.9)
LEUKOCYTE ESTERASE UR QL STRIP.AUTO: NEGATIVE
LYMPHOCYTES # BLD: 1.7 K/UL (ref 1–5.1)
LYMPHOCYTES NFR BLD: 32.8 %
MCH RBC QN AUTO: 30.6 PG (ref 26–34)
MCHC RBC AUTO-ENTMCNC: 34.2 G/DL (ref 31–36)
MCV RBC AUTO: 89.5 FL (ref 80–100)
MONOCYTES # BLD: 0.4 K/UL (ref 0–1.3)
MONOCYTES NFR BLD: 8.4 %
NEUTROPHILS # BLD: 2.9 K/UL (ref 1.7–7.7)
NEUTROPHILS NFR BLD: 55.7 %
NITRITE UR QL STRIP.AUTO: NEGATIVE
PERFORMED ON: NORMAL
PH UR STRIP.AUTO: 6 [PH] (ref 5–8)
PLATELET # BLD AUTO: 264 K/UL (ref 135–450)
PMV BLD AUTO: 7.4 FL (ref 5–10.5)
POTASSIUM SERPL-SCNC: 4.4 MMOL/L (ref 3.5–5.1)
PROT SERPL-MCNC: 6.7 G/DL (ref 6.4–8.2)
PROT UR STRIP.AUTO-MCNC: NEGATIVE MG/DL
RBC # BLD AUTO: 4.14 M/UL (ref 4–5.2)
SODIUM SERPL-SCNC: 141 MMOL/L (ref 136–145)
SP GR UR STRIP.AUTO: 1.02 (ref 1–1.03)
T4 FREE SERPL-MCNC: 2.5 NG/DL (ref 0.9–1.8)
TSH SERPL DL<=0.005 MIU/L-ACNC: <0.01 UIU/ML (ref 0.27–4.2)
UA COMPLETE W REFLEX CULTURE PNL UR: NORMAL
UA DIPSTICK W REFLEX MICRO PNL UR: NORMAL
URN SPEC COLLECT METH UR: NORMAL
UROBILINOGEN UR STRIP-ACNC: 0.2 E.U./DL
WBC # BLD AUTO: 5.3 K/UL (ref 4–11)

## 2025-05-31 PROCEDURE — 93005 ELECTROCARDIOGRAM TRACING: CPT

## 2025-05-31 PROCEDURE — 80053 COMPREHEN METABOLIC PANEL: CPT

## 2025-05-31 PROCEDURE — 81003 URINALYSIS AUTO W/O SCOPE: CPT

## 2025-05-31 PROCEDURE — 85025 COMPLETE CBC W/AUTO DIFF WBC: CPT

## 2025-05-31 PROCEDURE — 84439 ASSAY OF FREE THYROXINE: CPT

## 2025-05-31 PROCEDURE — 84443 ASSAY THYROID STIM HORMONE: CPT

## 2025-05-31 PROCEDURE — 99285 EMERGENCY DEPT VISIT HI MDM: CPT

## 2025-05-31 PROCEDURE — 83605 ASSAY OF LACTIC ACID: CPT

## 2025-05-31 PROCEDURE — 71045 X-RAY EXAM CHEST 1 VIEW: CPT

## 2025-05-31 NOTE — ED PROVIDER NOTES
THE [facility]  EMERGENCY DEPARTMENT ENCOUNTER          EM RESIDENT NOTE       Date of evaluation: 5/31/2025    Chief Complaint     Fatigue (Pt presents to the ED from facility d/t being less responsive. Pt is arousable and able to follow commands. Vitals stable. )      History of Present Illness     Shanice Guillen is a 89 y.o. female with a history of DM, CAD, hyperlipidemia, COPD on home O2 2-3L who presents to the ED via EMS for altered mental status.  Per EMS patient has been less responsive than baseline throughout the day.  Per them she was only reactive to painful stimuli.  She did have opioids on her medication list and they gave her Narcan without any improvement in her symptoms.  Staff reportedly deny any recent infectious symptoms.  History is otherwise limited secondary to patient altered mental status.    MEDICAL DECISION MAKING / ASSESSMENT / PLAN     INITIAL VITALS: BP: (!) 182/71, Temp: 97.7 °F (36.5 °C), Pulse: 69, Respirations: 18, SpO2: 100 %    Shanice Guillen is a 89 y.o. female presenting with altered mental status.  See ED course below for MDM and workup.    Is this patient to be included in the SEP-1 core measure? No Exclusion criteria - the patient is NOT to be included for SEP-1 Core Measure due to: 2+ SIRS criteria are not met    Medical Decision Making  Amount and/or Complexity of Data Reviewed  Labs: ordered.  Radiology: ordered.  ECG/medicine tests: ordered.        This patient was also evaluated by the attending physician. All care plans werediscussed and agreed upon.    Clinical Impression     1. Altered mental status, unspecified altered mental status type        Disposition     PATIENT REFERRED TO:  Analia Dee MD  [address] [place] [id number] [phone]    Schedule an appointment as soon as possible for a visit         DISCHARGE MEDICATIONS:  New Prescriptions    No medications on file       DISPOSITION Decision To Discharge

## 2025-06-01 LAB
EKG ATRIAL RATE: 68 BPM
EKG DIAGNOSIS: NORMAL
EKG P AXIS: 85 DEGREES
EKG P-R INTERVAL: 168 MS
EKG Q-T INTERVAL: 402 MS
EKG QRS DURATION: 72 MS
EKG QTC CALCULATION (BAZETT): 427 MS
EKG R AXIS: 64 DEGREES
EKG T AXIS: 71 DEGREES
EKG VENTRICULAR RATE: 68 BPM

## 2025-06-01 NOTE — ED PROVIDER NOTES
ED Attending Attestation Note     Date of evaluation: 5/31/2025    This patient was seen by the resident.  I have seen and examined the patient, agree with the workup, evaluation, management and diagnosis. The care plan has been discussed.  I have reviewed the ECG and concur with the resident's interpretation.  My assessment reveals a chronically ill-appearing 89-year-old female presenting with chief complaint of lethargy.  On examination, patient is laying on the cot with her eyes closed but awakes to voice, follows commands in all extremities.  No evidence of unilateral facial droop.  Pupils equal round and reactive to light.  Abdomen soft and nontender to palpation.     Tc De La Vega MD  05/31/25 [id number]

## 2025-06-01 NOTE — ED NOTES
Patient discharged by [facility] aware that patient will be coming back at their facility. Awaiting transportation     Mary Almanza RN  05/31/25 [id number]

## 2025-06-01 NOTE — DISCHARGE INSTRUCTIONS
Shanice was seen in the ER for altered mental status.  While in the ED her altered mental status spontaneously resolved.  We performed blood work including blood counts, electrolytes, urinalysis, and a chest x-ray that did not show any abnormalities or signs of infection.  One of her thyroid labs was slightly elevated however not so elevated that it would explain her symptoms.  Recommend she follow-up with her primary care physician for rechecking of her thyroid labs as needed.  Return to the ER for any worsening of mental status, increased oxygen requirements, persistent fevers, or for any other concerns.

## 2025-06-01 NOTE — ED NOTES
Patient taken by CMT, handover given to squad. patient left in no signs of distress     Mary Almanza RN  05/31/25 [id number]